# Patient Record
Sex: MALE | Race: WHITE | HISPANIC OR LATINO | Employment: UNEMPLOYED | URBAN - METROPOLITAN AREA
[De-identification: names, ages, dates, MRNs, and addresses within clinical notes are randomized per-mention and may not be internally consistent; named-entity substitution may affect disease eponyms.]

---

## 2022-03-01 ENCOUNTER — APPOINTMENT (OUTPATIENT)
Dept: CT IMAGING | Facility: HOSPITAL | Age: 22
End: 2022-03-01

## 2022-03-01 ENCOUNTER — HOSPITAL ENCOUNTER (INPATIENT)
Facility: HOSPITAL | Age: 22
LOS: 4 days | Discharge: HOME | End: 2022-03-05
Attending: EMERGENCY MEDICINE | Admitting: SURGERY

## 2022-03-01 ENCOUNTER — ANESTHESIA (OUTPATIENT)
Dept: PERIOP | Facility: HOSPITAL | Age: 22
End: 2022-03-01

## 2022-03-01 ENCOUNTER — ANESTHESIA EVENT (OUTPATIENT)
Dept: PERIOP | Facility: HOSPITAL | Age: 22
End: 2022-03-01

## 2022-03-01 DIAGNOSIS — K35.30 ACUTE APPENDICITIS WITH LOCALIZED PERITONITIS, WITHOUT PERFORATION, ABSCESS, OR GANGRENE: Primary | ICD-10-CM

## 2022-03-01 PROBLEM — K35.32 ACUTE APPENDICITIS WITH PERFORATION AND LOCALIZED PERITONITIS: Status: ACTIVE | Noted: 2022-03-01

## 2022-03-01 LAB
ALBUMIN SERPL-MCNC: 4.9 G/DL (ref 3.5–5.2)
ALBUMIN/GLOB SERPL: 1.8 G/DL
ALP SERPL-CCNC: 71 U/L (ref 39–117)
ALT SERPL W P-5'-P-CCNC: 14 U/L (ref 1–41)
ANION GAP SERPL CALCULATED.3IONS-SCNC: 9.1 MMOL/L (ref 5–15)
AST SERPL-CCNC: 15 U/L (ref 1–40)
BASOPHILS # BLD AUTO: 0.03 10*3/MM3 (ref 0–0.2)
BASOPHILS NFR BLD AUTO: 0.2 % (ref 0–1.5)
BILIRUB SERPL-MCNC: 1 MG/DL (ref 0–1.2)
BILIRUB UR QL STRIP: NEGATIVE
BUN SERPL-MCNC: 6 MG/DL (ref 6–20)
BUN/CREAT SERPL: 7.6 (ref 7–25)
CALCIUM SPEC-SCNC: 9.7 MG/DL (ref 8.6–10.5)
CHLORIDE SERPL-SCNC: 100 MMOL/L (ref 98–107)
CLARITY UR: CLEAR
CO2 SERPL-SCNC: 25.9 MMOL/L (ref 22–29)
COLOR UR: YELLOW
CREAT SERPL-MCNC: 0.79 MG/DL (ref 0.76–1.27)
DEPRECATED RDW RBC AUTO: 42.1 FL (ref 37–54)
EGFRCR SERPLBLD CKD-EPI 2021: 131.2 ML/MIN/1.73
EOSINOPHIL # BLD AUTO: 0.02 10*3/MM3 (ref 0–0.4)
EOSINOPHIL NFR BLD AUTO: 0.2 % (ref 0.3–6.2)
ERYTHROCYTE [DISTWIDTH] IN BLOOD BY AUTOMATED COUNT: 12.1 % (ref 12.3–15.4)
GLOBULIN UR ELPH-MCNC: 2.8 GM/DL
GLUCOSE SERPL-MCNC: 111 MG/DL (ref 65–99)
GLUCOSE UR STRIP-MCNC: NEGATIVE MG/DL
HCT VFR BLD AUTO: 43.3 % (ref 37.5–51)
HGB BLD-MCNC: 14.7 G/DL (ref 13–17.7)
HGB UR QL STRIP.AUTO: NEGATIVE
IMM GRANULOCYTES # BLD AUTO: 0.03 10*3/MM3 (ref 0–0.05)
IMM GRANULOCYTES NFR BLD AUTO: 0.2 % (ref 0–0.5)
KETONES UR QL STRIP: NEGATIVE
LEUKOCYTE ESTERASE UR QL STRIP.AUTO: NEGATIVE
LIPASE SERPL-CCNC: 17 U/L (ref 13–60)
LYMPHOCYTES # BLD AUTO: 1.29 10*3/MM3 (ref 0.7–3.1)
LYMPHOCYTES NFR BLD AUTO: 9.9 % (ref 19.6–45.3)
MCH RBC QN AUTO: 31.6 PG (ref 26.6–33)
MCHC RBC AUTO-ENTMCNC: 33.9 G/DL (ref 31.5–35.7)
MCV RBC AUTO: 93.1 FL (ref 79–97)
MONOCYTES # BLD AUTO: 1.12 10*3/MM3 (ref 0.1–0.9)
MONOCYTES NFR BLD AUTO: 8.6 % (ref 5–12)
NEUTROPHILS NFR BLD AUTO: 10.6 10*3/MM3 (ref 1.7–7)
NEUTROPHILS NFR BLD AUTO: 80.9 % (ref 42.7–76)
NITRITE UR QL STRIP: NEGATIVE
PH UR STRIP.AUTO: 7.5 [PH] (ref 4.5–8)
PLATELET # BLD AUTO: 209 10*3/MM3 (ref 140–450)
PMV BLD AUTO: 9.2 FL (ref 6–12)
POTASSIUM SERPL-SCNC: 4.3 MMOL/L (ref 3.5–5.2)
PROT SERPL-MCNC: 7.7 G/DL (ref 6–8.5)
PROT UR QL STRIP: NEGATIVE
RBC # BLD AUTO: 4.65 10*6/MM3 (ref 4.14–5.8)
SARS-COV-2 RNA PNL SPEC NAA+PROBE: NOT DETECTED
SODIUM SERPL-SCNC: 135 MMOL/L (ref 136–145)
SP GR UR STRIP: 1.02 (ref 1–1.03)
UROBILINOGEN UR QL STRIP: NORMAL
WBC NRBC COR # BLD: 13.09 10*3/MM3 (ref 3.4–10.8)

## 2022-03-01 PROCEDURE — 94799 UNLISTED PULMONARY SVC/PX: CPT

## 2022-03-01 PROCEDURE — 81003 URINALYSIS AUTO W/O SCOPE: CPT | Performed by: EMERGENCY MEDICINE

## 2022-03-01 PROCEDURE — 25010000002 ONDANSETRON PER 1 MG: Performed by: SURGERY

## 2022-03-01 PROCEDURE — 87077 CULTURE AEROBIC IDENTIFY: CPT | Performed by: SURGERY

## 2022-03-01 PROCEDURE — 25010000002 MORPHINE PER 10 MG: Performed by: SURGERY

## 2022-03-01 PROCEDURE — 99223 1ST HOSP IP/OBS HIGH 75: CPT | Performed by: SURGERY

## 2022-03-01 PROCEDURE — 87205 SMEAR GRAM STAIN: CPT | Performed by: SURGERY

## 2022-03-01 PROCEDURE — C1889 IMPLANT/INSERT DEVICE, NOC: HCPCS | Performed by: SURGERY

## 2022-03-01 PROCEDURE — 85025 COMPLETE CBC W/AUTO DIFF WBC: CPT | Performed by: EMERGENCY MEDICINE

## 2022-03-01 PROCEDURE — 25010000002 PROPOFOL 10 MG/ML EMULSION: Performed by: REGISTERED NURSE

## 2022-03-01 PROCEDURE — 25010000002 ONDANSETRON PER 1 MG: Performed by: NURSE ANESTHETIST, CERTIFIED REGISTERED

## 2022-03-01 PROCEDURE — 44960 APPENDECTOMY: CPT | Performed by: SURGERY

## 2022-03-01 PROCEDURE — 25010000002 SUCCINYLCHOLINE PER 20 MG: Performed by: REGISTERED NURSE

## 2022-03-01 PROCEDURE — 0WJG4ZZ INSPECTION OF PERITONEAL CAVITY, PERCUTANEOUS ENDOSCOPIC APPROACH: ICD-10-PCS | Performed by: SURGERY

## 2022-03-01 PROCEDURE — 87076 CULTURE ANAEROBE IDENT EACH: CPT | Performed by: SURGERY

## 2022-03-01 PROCEDURE — 25010000002 ONDANSETRON PER 1 MG: Performed by: EMERGENCY MEDICINE

## 2022-03-01 PROCEDURE — 87075 CULTR BACTERIA EXCEPT BLOOD: CPT | Performed by: SURGERY

## 2022-03-01 PROCEDURE — 0 IOPAMIDOL PER 1 ML: Performed by: EMERGENCY MEDICINE

## 2022-03-01 PROCEDURE — 25010000002 PIPERACILLIN SOD-TAZOBACTAM PER 1 G: Performed by: SURGERY

## 2022-03-01 PROCEDURE — 83690 ASSAY OF LIPASE: CPT | Performed by: EMERGENCY MEDICINE

## 2022-03-01 PROCEDURE — 94761 N-INVAS EAR/PLS OXIMETRY MLT: CPT

## 2022-03-01 PROCEDURE — 0DTJ0ZZ RESECTION OF APPENDIX, OPEN APPROACH: ICD-10-PCS | Performed by: SURGERY

## 2022-03-01 PROCEDURE — 74177 CT ABD & PELVIS W/CONTRAST: CPT

## 2022-03-01 PROCEDURE — 25010000002 PIPERACILLIN SOD-TAZOBACTAM PER 1 G

## 2022-03-01 PROCEDURE — 87635 SARS-COV-2 COVID-19 AMP PRB: CPT | Performed by: EMERGENCY MEDICINE

## 2022-03-01 PROCEDURE — 25010000002 FENTANYL CITRATE (PF) 50 MCG/ML SOLUTION: Performed by: REGISTERED NURSE

## 2022-03-01 PROCEDURE — 88304 TISSUE EXAM BY PATHOLOGIST: CPT | Performed by: SURGERY

## 2022-03-01 PROCEDURE — 80053 COMPREHEN METABOLIC PANEL: CPT | Performed by: EMERGENCY MEDICINE

## 2022-03-01 PROCEDURE — 87070 CULTURE OTHR SPECIMN AEROBIC: CPT | Performed by: SURGERY

## 2022-03-01 PROCEDURE — 25010000002 HYDROMORPHONE 1 MG/ML SOLUTION: Performed by: REGISTERED NURSE

## 2022-03-01 PROCEDURE — 99284 EMERGENCY DEPT VISIT MOD MDM: CPT

## 2022-03-01 PROCEDURE — 25010000002 MORPHINE PER 10 MG: Performed by: EMERGENCY MEDICINE

## 2022-03-01 PROCEDURE — 25010000002 NEOSTIGMINE 10 MG/10ML SOLUTION: Performed by: REGISTERED NURSE

## 2022-03-01 PROCEDURE — 25010000002 DEXAMETHASONE PER 1 MG: Performed by: NURSE ANESTHETIST, CERTIFIED REGISTERED

## 2022-03-01 PROCEDURE — 99284 EMERGENCY DEPT VISIT MOD MDM: CPT | Performed by: EMERGENCY MEDICINE

## 2022-03-01 DEVICE — ENDOPATH ECHELON VASCULAR  RELOADS, WHITE, 35MM
Type: IMPLANTABLE DEVICE | Site: ABDOMEN | Status: FUNCTIONAL
Brand: ECHELON ENDOPATH

## 2022-03-01 DEVICE — LIGAMAX 5 MM ENDOSCOPIC MULTIPLE CLIP APPLIER
Type: IMPLANTABLE DEVICE | Site: ABDOMEN | Status: FUNCTIONAL
Brand: LIGAMAX

## 2022-03-01 DEVICE — LIGACLIP MCA MULTIPLE CLIP APPLIERS, 20 MEDIUM CLIPS
Type: IMPLANTABLE DEVICE | Site: ABDOMEN | Status: FUNCTIONAL
Brand: LIGACLIP

## 2022-03-01 RX ORDER — LIDOCAINE HYDROCHLORIDE 10 MG/ML
0.5 INJECTION, SOLUTION EPIDURAL; INFILTRATION; INTRACAUDAL; PERINEURAL ONCE AS NEEDED
Status: DISCONTINUED | OUTPATIENT
Start: 2022-03-01 | End: 2022-03-01 | Stop reason: HOSPADM

## 2022-03-01 RX ORDER — OXYCODONE AND ACETAMINOPHEN 7.5; 325 MG/1; MG/1
1 TABLET ORAL ONCE AS NEEDED
Status: DISCONTINUED | OUTPATIENT
Start: 2022-03-01 | End: 2022-03-01 | Stop reason: HOSPADM

## 2022-03-01 RX ORDER — SODIUM CHLORIDE 0.9 % (FLUSH) 0.9 %
10 SYRINGE (ML) INJECTION AS NEEDED
Status: DISCONTINUED | OUTPATIENT
Start: 2022-03-01 | End: 2022-03-01 | Stop reason: HOSPADM

## 2022-03-01 RX ORDER — ONDANSETRON 2 MG/ML
4 INJECTION INTRAMUSCULAR; INTRAVENOUS ONCE AS NEEDED
Status: DISCONTINUED | OUTPATIENT
Start: 2022-03-01 | End: 2022-03-01 | Stop reason: HOSPADM

## 2022-03-01 RX ORDER — MIDAZOLAM HYDROCHLORIDE 2 MG/2ML
1 INJECTION, SOLUTION INTRAMUSCULAR; INTRAVENOUS
Status: DISCONTINUED | OUTPATIENT
Start: 2022-03-01 | End: 2022-03-01 | Stop reason: HOSPADM

## 2022-03-01 RX ORDER — SODIUM CHLORIDE 0.9 % (FLUSH) 0.9 %
10 SYRINGE (ML) INJECTION AS NEEDED
Status: DISCONTINUED | OUTPATIENT
Start: 2022-03-01 | End: 2022-03-05 | Stop reason: HOSPADM

## 2022-03-01 RX ORDER — SODIUM CHLORIDE, SODIUM LACTATE, POTASSIUM CHLORIDE, CALCIUM CHLORIDE 600; 310; 30; 20 MG/100ML; MG/100ML; MG/100ML; MG/100ML
100 INJECTION, SOLUTION INTRAVENOUS CONTINUOUS
Status: DISCONTINUED | OUTPATIENT
Start: 2022-03-01 | End: 2022-03-04

## 2022-03-01 RX ORDER — GLYCOPYRROLATE 0.2 MG/ML
INJECTION INTRAMUSCULAR; INTRAVENOUS AS NEEDED
Status: DISCONTINUED | OUTPATIENT
Start: 2022-03-01 | End: 2022-03-01 | Stop reason: SURG

## 2022-03-01 RX ORDER — MAGNESIUM HYDROXIDE 1200 MG/15ML
LIQUID ORAL AS NEEDED
Status: DISCONTINUED | OUTPATIENT
Start: 2022-03-01 | End: 2022-03-01 | Stop reason: HOSPADM

## 2022-03-01 RX ORDER — PIPERACILLIN SODIUM, TAZOBACTAM SODIUM 4; .5 G/20ML; G/20ML
INJECTION, POWDER, LYOPHILIZED, FOR SOLUTION INTRAVENOUS
Status: COMPLETED
Start: 2022-03-01 | End: 2022-03-01

## 2022-03-01 RX ORDER — NEOSTIGMINE METHYLSULFATE 1 MG/ML
INJECTION, SOLUTION INTRAVENOUS AS NEEDED
Status: DISCONTINUED | OUTPATIENT
Start: 2022-03-01 | End: 2022-03-01 | Stop reason: SURG

## 2022-03-01 RX ORDER — SODIUM CHLORIDE 9 MG/ML
40 INJECTION, SOLUTION INTRAVENOUS AS NEEDED
Status: DISCONTINUED | OUTPATIENT
Start: 2022-03-01 | End: 2022-03-01 | Stop reason: HOSPADM

## 2022-03-01 RX ORDER — PROPOFOL 10 MG/ML
VIAL (ML) INTRAVENOUS AS NEEDED
Status: DISCONTINUED | OUTPATIENT
Start: 2022-03-01 | End: 2022-03-01 | Stop reason: SURG

## 2022-03-01 RX ORDER — ACETAMINOPHEN 325 MG/1
650 TABLET ORAL EVERY 6 HOURS PRN
Status: DISCONTINUED | OUTPATIENT
Start: 2022-03-01 | End: 2022-03-05 | Stop reason: HOSPADM

## 2022-03-01 RX ORDER — MORPHINE SULFATE 10 MG/ML
4 INJECTION INTRAMUSCULAR; INTRAVENOUS; SUBCUTANEOUS
Status: DISCONTINUED | OUTPATIENT
Start: 2022-03-01 | End: 2022-03-05 | Stop reason: HOSPADM

## 2022-03-01 RX ORDER — MORPHINE SULFATE 2 MG/ML
2 INJECTION, SOLUTION INTRAMUSCULAR; INTRAVENOUS
Status: DISCONTINUED | OUTPATIENT
Start: 2022-03-01 | End: 2022-03-05 | Stop reason: HOSPADM

## 2022-03-01 RX ORDER — DEXMEDETOMIDINE HYDROCHLORIDE 100 UG/ML
INJECTION, SOLUTION INTRAVENOUS AS NEEDED
Status: DISCONTINUED | OUTPATIENT
Start: 2022-03-01 | End: 2022-03-01 | Stop reason: SURG

## 2022-03-01 RX ORDER — OXYCODONE HYDROCHLORIDE AND ACETAMINOPHEN 5; 325 MG/1; MG/1
1 TABLET ORAL EVERY 4 HOURS PRN
Status: DISCONTINUED | OUTPATIENT
Start: 2022-03-01 | End: 2022-03-05 | Stop reason: HOSPADM

## 2022-03-01 RX ORDER — SODIUM CHLORIDE 9 MG/ML
INJECTION, SOLUTION INTRAVENOUS
Status: DISPENSED
Start: 2022-03-01 | End: 2022-03-02

## 2022-03-01 RX ORDER — BUPIVACAINE HYDROCHLORIDE AND EPINEPHRINE 2.5; 5 MG/ML; UG/ML
INJECTION, SOLUTION EPIDURAL; INFILTRATION; INTRACAUDAL; PERINEURAL AS NEEDED
Status: DISCONTINUED | OUTPATIENT
Start: 2022-03-01 | End: 2022-03-01 | Stop reason: HOSPADM

## 2022-03-01 RX ORDER — ROCURONIUM BROMIDE 10 MG/ML
INJECTION, SOLUTION INTRAVENOUS AS NEEDED
Status: DISCONTINUED | OUTPATIENT
Start: 2022-03-01 | End: 2022-03-01 | Stop reason: SURG

## 2022-03-01 RX ORDER — SODIUM CHLORIDE 0.9 % (FLUSH) 0.9 %
10 SYRINGE (ML) INJECTION EVERY 12 HOURS SCHEDULED
Status: DISCONTINUED | OUTPATIENT
Start: 2022-03-01 | End: 2022-03-01 | Stop reason: HOSPADM

## 2022-03-01 RX ORDER — FENTANYL CITRATE 50 UG/ML
25 INJECTION, SOLUTION INTRAMUSCULAR; INTRAVENOUS
Status: DISCONTINUED | OUTPATIENT
Start: 2022-03-01 | End: 2022-03-01 | Stop reason: HOSPADM

## 2022-03-01 RX ORDER — SODIUM CHLORIDE, SODIUM LACTATE, POTASSIUM CHLORIDE, CALCIUM CHLORIDE 600; 310; 30; 20 MG/100ML; MG/100ML; MG/100ML; MG/100ML
100 INJECTION, SOLUTION INTRAVENOUS CONTINUOUS
Status: DISCONTINUED | OUTPATIENT
Start: 2022-03-01 | End: 2022-03-01

## 2022-03-01 RX ORDER — SUCCINYLCHOLINE CHLORIDE 20 MG/ML
INJECTION INTRAMUSCULAR; INTRAVENOUS AS NEEDED
Status: DISCONTINUED | OUTPATIENT
Start: 2022-03-01 | End: 2022-03-01 | Stop reason: SURG

## 2022-03-01 RX ORDER — DEXAMETHASONE SODIUM PHOSPHATE 4 MG/ML
8 INJECTION, SOLUTION INTRA-ARTICULAR; INTRALESIONAL; INTRAMUSCULAR; INTRAVENOUS; SOFT TISSUE ONCE AS NEEDED
Status: COMPLETED | OUTPATIENT
Start: 2022-03-01 | End: 2022-03-01

## 2022-03-01 RX ORDER — ONDANSETRON 2 MG/ML
4 INJECTION INTRAMUSCULAR; INTRAVENOUS ONCE AS NEEDED
Status: COMPLETED | OUTPATIENT
Start: 2022-03-01 | End: 2022-03-01

## 2022-03-01 RX ORDER — ONDANSETRON 2 MG/ML
4 INJECTION INTRAMUSCULAR; INTRAVENOUS EVERY 4 HOURS PRN
Status: DISCONTINUED | OUTPATIENT
Start: 2022-03-01 | End: 2022-03-05 | Stop reason: HOSPADM

## 2022-03-01 RX ORDER — FAMOTIDINE 10 MG/ML
20 INJECTION, SOLUTION INTRAVENOUS
Status: COMPLETED | OUTPATIENT
Start: 2022-03-01 | End: 2022-03-01

## 2022-03-01 RX ORDER — FENTANYL CITRATE 50 UG/ML
INJECTION, SOLUTION INTRAMUSCULAR; INTRAVENOUS AS NEEDED
Status: DISCONTINUED | OUTPATIENT
Start: 2022-03-01 | End: 2022-03-01 | Stop reason: SURG

## 2022-03-01 RX ORDER — ACETAMINOPHEN 500 MG
500 TABLET ORAL EVERY 6 HOURS PRN
COMMUNITY
End: 2022-03-05 | Stop reason: HOSPADM

## 2022-03-01 RX ORDER — KETAMINE HYDROCHLORIDE 100 MG/ML
INJECTION INTRAMUSCULAR; INTRAVENOUS AS NEEDED
Status: DISCONTINUED | OUTPATIENT
Start: 2022-03-01 | End: 2022-03-01 | Stop reason: SURG

## 2022-03-01 RX ORDER — MEPERIDINE HYDROCHLORIDE 25 MG/ML
12.5 INJECTION INTRAMUSCULAR; INTRAVENOUS; SUBCUTANEOUS
Status: DISCONTINUED | OUTPATIENT
Start: 2022-03-01 | End: 2022-03-01 | Stop reason: HOSPADM

## 2022-03-01 RX ORDER — SODIUM CHLORIDE, SODIUM LACTATE, POTASSIUM CHLORIDE, CALCIUM CHLORIDE 600; 310; 30; 20 MG/100ML; MG/100ML; MG/100ML; MG/100ML
9 INJECTION, SOLUTION INTRAVENOUS CONTINUOUS
Status: DISCONTINUED | OUTPATIENT
Start: 2022-03-01 | End: 2022-03-01

## 2022-03-01 RX ORDER — ONDANSETRON 2 MG/ML
4 INJECTION INTRAMUSCULAR; INTRAVENOUS ONCE
Status: COMPLETED | OUTPATIENT
Start: 2022-03-01 | End: 2022-03-01

## 2022-03-01 RX ADMIN — PIPERACILLIN AND TAZOBACTAM 4.5 G: 4; .5 INJECTION, POWDER, LYOPHILIZED, FOR SOLUTION INTRAVENOUS; PARENTERAL at 12:44

## 2022-03-01 RX ADMIN — ONDANSETRON 4 MG: 2 INJECTION INTRAMUSCULAR; INTRAVENOUS at 21:14

## 2022-03-01 RX ADMIN — ROCURONIUM BROMIDE 5 MG: 10 INJECTION INTRAVENOUS at 13:39

## 2022-03-01 RX ADMIN — HYDROMORPHONE HYDROCHLORIDE 0.25 MG: 1 INJECTION, SOLUTION INTRAMUSCULAR; INTRAVENOUS; SUBCUTANEOUS at 17:13

## 2022-03-01 RX ADMIN — DEXMEDETOMIDINE 4 MCG: 100 INJECTION, SOLUTION, CONCENTRATE INTRAVENOUS at 15:04

## 2022-03-01 RX ADMIN — ONDANSETRON 4 MG: 2 INJECTION INTRAMUSCULAR; INTRAVENOUS at 13:25

## 2022-03-01 RX ADMIN — DEXMEDETOMIDINE 4 MCG: 100 INJECTION, SOLUTION, CONCENTRATE INTRAVENOUS at 13:39

## 2022-03-01 RX ADMIN — FENTANYL CITRATE 100 MCG: 50 INJECTION INTRAMUSCULAR; INTRAVENOUS at 14:40

## 2022-03-01 RX ADMIN — KETAMINE HYDROCHLORIDE 10 MG: 100 INJECTION INTRAMUSCULAR; INTRAVENOUS at 14:13

## 2022-03-01 RX ADMIN — HYDROMORPHONE HYDROCHLORIDE 0.25 MG: 1 INJECTION, SOLUTION INTRAMUSCULAR; INTRAVENOUS; SUBCUTANEOUS at 17:00

## 2022-03-01 RX ADMIN — DEXMEDETOMIDINE 4 MCG: 100 INJECTION, SOLUTION, CONCENTRATE INTRAVENOUS at 14:34

## 2022-03-01 RX ADMIN — MORPHINE SULFATE 4 MG: 4 INJECTION INTRAVENOUS at 11:43

## 2022-03-01 RX ADMIN — FENTANYL CITRATE 50 MCG: 50 INJECTION INTRAMUSCULAR; INTRAVENOUS at 13:39

## 2022-03-01 RX ADMIN — HYDROMORPHONE HYDROCHLORIDE 0.25 MG: 1 INJECTION, SOLUTION INTRAMUSCULAR; INTRAVENOUS; SUBCUTANEOUS at 17:23

## 2022-03-01 RX ADMIN — KETAMINE HYDROCHLORIDE 10 MG: 100 INJECTION INTRAMUSCULAR; INTRAVENOUS at 14:34

## 2022-03-01 RX ADMIN — IOPAMIDOL 100 ML: 755 INJECTION, SOLUTION INTRAVENOUS at 12:04

## 2022-03-01 RX ADMIN — DEXMEDETOMIDINE 4 MCG: 100 INJECTION, SOLUTION, CONCENTRATE INTRAVENOUS at 14:13

## 2022-03-01 RX ADMIN — KETAMINE HYDROCHLORIDE 10 MG: 100 INJECTION INTRAMUSCULAR; INTRAVENOUS at 13:39

## 2022-03-01 RX ADMIN — HYDROMORPHONE HYDROCHLORIDE 0.25 MG: 1 INJECTION, SOLUTION INTRAMUSCULAR; INTRAVENOUS; SUBCUTANEOUS at 16:35

## 2022-03-01 RX ADMIN — MORPHINE SULFATE 2 MG: 2 INJECTION, SOLUTION INTRAMUSCULAR; INTRAVENOUS at 20:41

## 2022-03-01 RX ADMIN — SUCCINYLCHOLINE CHLORIDE 100 MG: 20 INJECTION, SOLUTION INTRAMUSCULAR; INTRAVENOUS at 13:39

## 2022-03-01 RX ADMIN — HYDROMORPHONE HYDROCHLORIDE 0.25 MG: 1 INJECTION, SOLUTION INTRAMUSCULAR; INTRAVENOUS; SUBCUTANEOUS at 16:25

## 2022-03-01 RX ADMIN — DEXAMETHASONE SODIUM PHOSPHATE 8 MG: 4 INJECTION, SOLUTION INTRAMUSCULAR; INTRAVENOUS at 13:24

## 2022-03-01 RX ADMIN — ROCURONIUM BROMIDE 10 MG: 10 INJECTION INTRAVENOUS at 14:34

## 2022-03-01 RX ADMIN — ONDANSETRON 4 MG: 2 INJECTION INTRAMUSCULAR; INTRAVENOUS at 11:42

## 2022-03-01 RX ADMIN — SODIUM CHLORIDE, POTASSIUM CHLORIDE, SODIUM LACTATE AND CALCIUM CHLORIDE 9 ML/HR: 600; 310; 30; 20 INJECTION, SOLUTION INTRAVENOUS at 13:25

## 2022-03-01 RX ADMIN — FAMOTIDINE 20 MG: 10 INJECTION, SOLUTION INTRAVENOUS at 13:25

## 2022-03-01 RX ADMIN — SODIUM CHLORIDE, POTASSIUM CHLORIDE, SODIUM LACTATE AND CALCIUM CHLORIDE 100 ML/HR: 600; 310; 30; 20 INJECTION, SOLUTION INTRAVENOUS at 20:26

## 2022-03-01 RX ADMIN — FENTANYL CITRATE 50 MCG: 50 INJECTION INTRAMUSCULAR; INTRAVENOUS at 13:45

## 2022-03-01 RX ADMIN — GLYCOPYRROLATE 0.4 MG: 0.2 INJECTION INTRAMUSCULAR; INTRAVENOUS at 15:31

## 2022-03-01 RX ADMIN — PIPERACILLIN AND TAZOBACTAM 4.5 G: 4; .5 INJECTION, POWDER, LYOPHILIZED, FOR SOLUTION INTRAVENOUS; PARENTERAL at 20:26

## 2022-03-01 RX ADMIN — NEOSTIGMINE METHYLSULFATE 3 MG: 1 INJECTION INTRAVENOUS at 15:31

## 2022-03-01 RX ADMIN — ROCURONIUM BROMIDE 30 MG: 10 INJECTION INTRAVENOUS at 13:47

## 2022-03-01 RX ADMIN — PROPOFOL 200 MG: 10 INJECTION, EMULSION INTRAVENOUS at 13:39

## 2022-03-01 RX ADMIN — HYDROMORPHONE HYDROCHLORIDE 0.25 MG: 1 INJECTION, SOLUTION INTRAMUSCULAR; INTRAVENOUS; SUBCUTANEOUS at 16:15

## 2022-03-01 NOTE — PLAN OF CARE
Goal Outcome Evaluation:  Plan of Care Reviewed With: patient        Progress: improving  Outcome Summary: vss. pt arrived to floor s/p lap appy, dressing in place c,d,i. RAVEN drain with serrosangeous output noted. pt a&o x4 with significant other at bedside. sepsis screened positive- md aware. iv antibiotics in place. iv fluids in place. no other complaints at this time.

## 2022-03-01 NOTE — ANESTHESIA PREPROCEDURE EVALUATION
Anesthesia Evaluation     Patient summary reviewed and Nursing notes reviewed   NPO Solid Status: > 8 hours  NPO Liquid Status: > 2 hours           Airway   Mallampati: II  TM distance: >3 FB  Neck ROM: full  No difficulty expected  Dental - normal exam     Pulmonary - negative pulmonary ROS    breath sounds clear to auscultation  Cardiovascular   Exercise tolerance: good (4-7 METS)    Rhythm: regular  Rate: abnormal        Neuro/Psych- negative ROS  GI/Hepatic/Renal/Endo - negative ROS     Musculoskeletal     Abdominal    Substance History - negative use     OB/GYN negative ob/gyn ROS         Other        ROS/Med Hx Other: Tylenol at 1200.    Banana milkshake at 0900                  Anesthesia Plan    ASA 1 - emergent     general     intravenous induction     Anesthetic plan, all risks, benefits, and alternatives have been provided, discussed and informed consent has been obtained with: patient.  Use of blood products discussed with patient  Consented to blood products.   Plan discussed with CRNA.        CODE STATUS:

## 2022-03-01 NOTE — ED PROVIDER NOTES
EMERGENCY DEPARTMENT ENCOUNTER    Room Number:  [facility] OR/MAIN OR  Date seen:  3/1/2022  PCP: Provider, No Known  Historian: Patient      HPI:  Chief Complaint: Abdominal pain  A complete HPI/ROS/PMH/PSH/SH/FH are unobtainable due to: Nothing  Context: Juan Michaels is a 21 y.o. male who presents to the ED c/o right lower quadrant abdominal pain that has been ongoing for the last 2 or 3 days. He has had associated nausea and vomiting. He denies diarrhea. He thinks he may have had fever 1 day. He denies any previous abdominal surgeries. He does not smoke. Pain is currently moderate severity, localized to the right lower quadrant, nonradiating. He denies dysuria.            PAST MEDICAL HISTORY  Active Ambulatory Problems     Diagnosis Date Noted   • No Active Ambulatory Problems     Resolved Ambulatory Problems     Diagnosis Date Noted   • No Resolved Ambulatory Problems     No Additional Past Medical History         PAST SURGICAL HISTORY  History reviewed. No pertinent surgical history.      FAMILY HISTORY  History reviewed. No pertinent family history.      SOCIAL HISTORY  Social History     Socioeconomic History   • Marital status: Single   Tobacco Use   • Smoking status: Never Smoker   Substance and Sexual Activity   • Alcohol use: Yes     Comment: a little   • Drug use: Never   Patient does not smoke    ALLERGIES  Patient has no known allergies.        REVIEW OF SYSTEMS  Review of Systems   Review of all 14 systems is negative other than stated in the HPI above.      PHYSICAL EXAM  ED Triage Vitals [03/01/22 1049]   Temp Heart Rate Resp BP SpO2   98.8 °F (37.1 °C) (!) 134 16 141/81 97 %      Temp src Heart Rate Source Patient Position BP Location FiO2 (%)   Oral Monitor -- -- --         GENERAL: Awake and alert, no acute distress  HENT: nares patent  EYES: no scleral icterus, EOMI, pupils 3 mm reactive bilaterally  CV: regular rhythm, mildly tachycardic  RESPIRATORY: normal effort, lungs clear to auscultation  bilaterally  ABDOMEN: soft, focal tenderness right lower quadrant with rebound tenderness present, abdomen nondistended, negative Rovsing's  MUSCULOSKELETAL: no deformity  NEURO: alert, moves all extremities, follows commands  PSYCH:  calm, cooperative  SKIN: warm, dry, normal to inspection, no rash    Vital signs and nursing notes reviewed.          LAB RESULTS  Recent Results (from the past 24 hour(s))   Comprehensive Metabolic Panel    Collection Time: 03/01/22 11:05 AM    Specimen: Blood   Result Value Ref Range    Glucose 111 (H) 65 - 99 mg/dL    BUN 6 6 - 20 mg/dL    Creatinine 0.79 0.76 - 1.27 mg/dL    Sodium 135 (L) 136 - 145 mmol/L    Potassium 4.3 3.5 - 5.2 mmol/L    Chloride 100 98 - 107 mmol/L    CO2 25.9 22.0 - 29.0 mmol/L    Calcium 9.7 8.6 - 10.5 mg/dL    Total Protein 7.7 6.0 - 8.5 g/dL    Albumin 4.90 3.50 - 5.20 g/dL    ALT (SGPT) 14 1 - 41 U/L    AST (SGOT) 15 1 - 40 U/L    Alkaline Phosphatase 71 39 - 117 U/L    Total Bilirubin 1.0 0.0 - 1.2 mg/dL    Globulin 2.8 gm/dL    A/G Ratio 1.8 g/dL    BUN/Creatinine Ratio 7.6 7.0 - 25.0    Anion Gap 9.1 5.0 - 15.0 mmol/L    eGFR 131.2 >60.0 mL/min/1.73   Lipase    Collection Time: 03/01/22 11:05 AM    Specimen: Blood   Result Value Ref Range    Lipase 17 13 - 60 U/L   CBC Auto Differential    Collection Time: 03/01/22 11:05 AM    Specimen: Blood   Result Value Ref Range    WBC 13.09 (H) 3.40 - 10.80 10*3/mm3    RBC 4.65 4.14 - 5.80 10*6/mm3    Hemoglobin 14.7 13.0 - 17.7 g/dL    Hematocrit 43.3 37.5 - 51.0 %    MCV 93.1 79.0 - 97.0 fL    MCH 31.6 26.6 - 33.0 pg    MCHC 33.9 31.5 - 35.7 g/dL    RDW 12.1 (L) 12.3 - 15.4 %    RDW-SD 42.1 37.0 - 54.0 fl    MPV 9.2 6.0 - 12.0 fL    Platelets 209 140 - 450 10*3/mm3    Neutrophil % 80.9 (H) 42.7 - 76.0 %    Lymphocyte % 9.9 (L) 19.6 - 45.3 %    Monocyte % 8.6 5.0 - 12.0 %    Eosinophil % 0.2 (L) 0.3 - 6.2 %    Basophil % 0.2 0.0 - 1.5 %    Immature Grans % 0.2 0.0 - 0.5 %    Neutrophils, Absolute 10.60 (H) 1.70  - 7.00 10*3/mm3    Lymphocytes, Absolute 1.29 0.70 - 3.10 10*3/mm3    Monocytes, Absolute 1.12 (H) 0.10 - 0.90 10*3/mm3    Eosinophils, Absolute 0.02 0.00 - 0.40 10*3/mm3    Basophils, Absolute 0.03 0.00 - 0.20 10*3/mm3    Immature Grans, Absolute 0.03 0.00 - 0.05 10*3/mm3   Urinalysis With Microscopic If Indicated (No Culture) - Urine, Clean Catch    Collection Time: 03/01/22 11:25 AM    Specimen: Urine, Clean Catch   Result Value Ref Range    Color, UA Yellow Yellow, Straw    Appearance, UA Clear Clear    pH, UA 7.5 4.5 - 8.0    Specific Gravity, UA 1.025 1.003 - 1.030    Glucose, UA Negative Negative    Ketones, UA Negative Negative    Bilirubin, UA Negative Negative    Blood, UA Negative Negative    Protein, UA Negative Negative    Leuk Esterase, UA Negative Negative    Nitrite, UA Negative Negative    Urobilinogen, UA 0.2 E.U./dL 0.2 - 1.0 E.U./dL   COVID-19,[employer] IN-HOUSE,Nasal Swab No Transport Media 3-4 HR TAT - Swab, Nasal Cavity    Collection Time: 03/01/22 11:43 AM    Specimen: Nasal Cavity; Swab   Result Value Ref Range    COVID19 Not Detected Not Detected - Ref. Range       Ordered the above labs and reviewed the results.        RADIOLOGY  CT Abdomen Pelvis With Contrast    Result Date: 3/1/2022  CT Abdomen Pelvis W INDICATION: 21-year-old emergency department patient complaining of 2 day history of right-sided abdominal pain. Vomiting. TECHNIQUE: CT of the abdomen and pelvis with Isovue-370-100 cc IV contrast. Coronal and sagittal reconstructions were obtained.  Radiation dose reduction techniques included automated exposure control or exposure modulation based on body size. Count of known CT and cardiac med studies performed in previous 12 months: 0. COMPARISON: None available. FINDINGS: Abdomen: Included images through the lung bases are clear. There are no effusions. The distal esophagus is normal The liver, spleen, pancreas, gallbladder, bile ducts, adrenal glands and kidneys appear normal. The  abdominal aorta is normal in caliber. The stomach and small bowel are normal. The right lower quadrant is abnormal. The appendix is distended with thickened wall and enhancement of the mucosa. Diameter up to 2.2 cm. There is minimal adjacent fluid and stranding. Findings are consistent with acute appendicitis. The fluid extends into the right paracolic gutter cephalad to the cecum. No fluid is seen elsewhere in the abdomen or pelvis. Colon is normal. Pelvis: The bladder, seminal vesicles and prostate are normal. No inguinal hernia. Bones are normal     The right lower quadrant is abnormal consistent with acute appendicitis. There is linear stranding and fluid around the appendix extending into the right colic gutter. There is no free air seen. Perforation perforation of the appendix is a concern given the amount of surrounding fluid. There is no free air. Surgical consultation recommended NOTIFICATION: Critical Value/emergent results were called by telephone at the time of interpretation on 3/1/2022 12:19 PM to DIANN Tamayo for Dr. Allen MD who verbally acknowledged these results. Dr. Villa was in the room with the patient and the  tech agreed to communicate the findings to Dr. Villa who was aware of the findings of acute appendicitis already. Signer Name: Darlene Manning MD  Signed: 3/1/2022 12:22 PM  Workstation Name: [id number]  [facility]      Ordered the above noted radiological studies. Reviewed by me in PACS.            PROCEDURES  Procedures              MEDICATIONS GIVEN IN ER  Medications   sodium chloride 0.9 % flush 10 mL ( Intravenous MAR Hold 3/1/22 1306)   sodium chloride 0.9 % infusion  - ADS Override Pull (has no administration in time range)   piperacillin-tazobactam (ZOSYN) 4.5 g/100 mL 0.9% NS IVPB (mbp) (has no administration in time range)   sodium chloride 0.9 % flush 10 mL (has no administration in time range)   sodium chloride 0.9 % flush 10 mL (has no  administration in time range)   sodium chloride 0.9 % infusion 40 mL (has no administration in time range)   lidocaine PF 1% (XYLOCAINE) injection 0.5 mL (has no administration in time range)   lactated ringers infusion ( Intravenous Rate/Dose Change 3/1/22 1338)   Midazolam HCl (PF) (VERSED) injection 1 mg (has no administration in time range)   sterile water irrigation solution (500 mL Irrigation Given 3/1/22 1402)   ondansetron (ZOFRAN) injection 4 mg (4 mg Intravenous Given 3/1/22 1142)   morphine injection 4 mg (4 mg Intravenous Given 3/1/22 1143)   iopamidol (ISOVUE-370) 76 % injection 100 mL (100 mL Intravenous Given 3/1/22 1204)   piperacillin-tazobactam (ZOSYN) 4.5 (4-0.5) g injection  - ADS Override Pull (4.5 g  New Bag 3/1/22 1244)   famotidine (PEPCID) injection 20 mg (20 mg Intravenous Given 3/1/22 1325)   ondansetron (ZOFRAN) injection 4 mg (4 mg Intravenous Given 3/1/22 1325)   dexamethasone (DECADRON) injection 8 mg (8 mg Intravenous Given 3/1/22 1324)                   MEDICAL DECISION MAKING, PROGRESS, and CONSULTS    All labs have been independently reviewed by me.  All radiology studies have been reviewed by me and discussed with radiologist dictating the report.   EKG's independently viewed and interpreted by me.  Discussion below represents my analysis of pertinent findings related to patient's condition, differential diagnosis, treatment plan and final disposition.      Differential diagnosis includes but is not limited to:  Acute appendicitis  Terminal ileitis  Cholecystitis  Colitis  Ureteral calculus      ED Course as of 03/01/22 1452   Tue Mar 01, 2022   1215 CT abdomen pelvis independently interpreted in PACS.  There appears to be evidence of acute appendicitis with periappendiceal fat stranding in the right lower abdomen.  I not see evidence of perforation or abscess.  I have ordered empiric Zosyn.  I have placed a consult to general surgery. [JR]   1217 Discussed with Dr. Franz, general  surgery, who will come and evaluate the patient shortly. [JR]   1223 Patient informed of CT findings of appendicitis and plan for surgical consultation with Dr. Franz today.  He reports he has had nothing to eat or drink today.  I used the  service and answered all questions. [JR]   1241 COVID19: Not Detected [JR]      ED Course User Index  [JR] Tip Villa MD              I wore an N95 mask, face shield, and gloves during this patient encounter.  Patient also wearing a surgical mask.  Hand hygeine performed before and after seeing the patient.    DIAGNOSIS  Final diagnoses:   Acute appendicitis with localized peritonitis, without perforation, abscess, or gangrene         DISPOSITION  ADMIT            Latest Documented Vital Signs:  As of 14:52 EST  BP- 134/81 HR- (!) 134 Temp- (!) 103.2 °F (39.6 °C) (Oral) O2 sat- 98%        --    Please note that portions of this were completed with a voice recognition program.          Tip Villa MD  03/01/22 6405

## 2022-03-01 NOTE — ANESTHESIA PROCEDURE NOTES
Airway  Urgency: elective    Date/Time: 3/1/2022 1:40 PM  Airway not difficult    General Information and Staff    Patient location during procedure: OR    Indications and Patient Condition  Indications for airway management: airway protection    Preoxygenated: yes  MILS maintained throughout  Mask difficulty assessment: 1 - vent by mask    Final Airway Details  Final airway type: endotracheal airway      Successful airway: ETT  Cuffed: yes   Successful intubation technique: direct laryngoscopy  Facilitating devices/methods: intubating stylet  Endotracheal tube insertion site: oral  Blade: Wells  Blade size: 3  ETT size (mm): 7.0  Cormack-Lehane Classification: grade IIa - partial view of glottis  Placement verified by: chest auscultation and capnometry   Cuff volume (mL): 5  Measured from: lips  ETT/EBT  to lips (cm): 22  Number of attempts at approach: 1  Assessment: lips, teeth, and gum same as pre-op and atraumatic intubation

## 2022-03-01 NOTE — ANESTHESIA POSTPROCEDURE EVALUATION
Patient: Juan Michaels    Procedure Summary     Date: 03/01/22 Room / Location:  [facility] OR 2 /  [facility] OR    Anesthesia Start: 1338 Anesthesia Stop: 1549    Procedures:       APPENDECTOMY LAPAROSCOPIC, attempted (N/A Abdomen)      APPENDECTOMY (N/A Abdomen) Diagnosis:       Acute appendicitis with localized peritonitis, without perforation, abscess, or gangrene      (Acute appendicitis with localized peritonitis, without perforation, abscess, or gangrene [K35.30])    Surgeons: Sherif Franz MD Provider: Grayson Mantilla CRNA    Anesthesia Type: general ASA Status: 1 - Emergent          Anesthesia Type: general    Vitals  Vitals Value Taken Time   /57 03/01/22 1725   Temp 98 °F (36.7 °C) 03/01/22 1544   Pulse 98 03/01/22 1731   Resp 17 03/01/22 1725   SpO2 96 % 03/01/22 1731   Vitals shown include unvalidated device data.        Post Anesthesia Care and Evaluation    Patient location during evaluation: PACU  Patient participation: complete - patient participated  Level of consciousness: awake  Pain score: 1  Pain management: adequate  Airway patency: patent  Anesthetic complications: No anesthetic complications  PONV Status: noneHydration status: acceptable

## 2022-03-01 NOTE — OP NOTE
Preoperative diagnosis acute appendicitis  Postoperative diagnosis acute perforated retrocecal appendicitis  Procedure laparoscopic converted to an open appendectomy  Complications none  Drains 10 [place] Roc drain  Estimated blood loss 25 cc  Anesthesia General via endotracheal tube   Surgeon Dr. Franz  Assistant Alvarado Tello  Specimen appendix and peritoneal culture to pathology  Findings perforated retrocecal appendicitis, the appendix appeared to be gangrenous and portions  Procedure after satisfactory induction of general anesthesia via endotracheal tube the patient's abdomen was prepped and draped in sterile fashion.  He was straight cathed.  Skin incision was made in the vertical midline infraumbilically carried down through the skin and subcutaneous tissue.  Fascia was controlled medially and laterally between 0 Ethibond stay sutures.  Fascia and peritoneum were divided Portia trocar was placed within the abdomen under direct vision.  Abdomen was insufflated to 14 mmHg with use of CO2.  He had inflammation in the right lower quadrant lateral and behind the cecum.  There is moderate amount of edema, the base appendix was identified.  5 mm ports x2 were placed 1 in the lower midline 1 in the left lower quadrant under direct vision after each site of been locally infiltrated with quarter percent Marcaine with epinephrine.  The base appendix was able to be controlled circumferentially was fired across with Endo LIZZIE V.  The appendix went retrocecally and we mobilized the proximal appendix and the distal appendix between 5 mm hemoclips.  Additional 5 mm port was placed in the right middle quadrant under direct vision after the site at been locally infiltrated with quarter percent Marcaine with epinephrine this allowed placement of a retractor on the cecum.  We were unable to get the midportion of the appendix elevated.  There is evidence of distal perforation cultures were taken aerobically and anaerobically.   Because we were unable to get the midportion of the appendix dissected out it was elected to convert to an open.  5 mm ports sequentially pulled back all were hemostatic.  Midline skin incision was extended cephalad and caudad.  Portia trocar was removed.  This fascia was divided cephalad and caudad peritoneum was divided.  The bowel was packed off Bookwalter retractor was placed.  The midportion of the appendix was then dissected out.  Mesentery was divided tween 5 mm hemoclips and 2-0 silk free ties where appropriate.  The staple line on the appendiceal stump appeared to be intact.  Appendix was completely mobilized and was passed off.  Right lower quadrant was liberally irrigated hemostasis was assured.  10 Turkmen round Roc drain was brought out the right sided 5 mm trocar site was placed adjacent to the cecum adjacent to the appendiceal stump and down into the pelvis.  Drain was sutured to the skin with use of 2-0 silk.  All counts were correct.  Fascia was closed in a running simple fashion with use of #1 Prolene starting in each corner and tying in the center of the wound.  Subcutaneous tissues liberally irrigated hemostasis assured and skin incisions were closed with widely spaced skin staples.  Quarter inch iodoform gauze was wick between the staples into the subcutaneous tissue with a continuous loop.  Drain was hooked to self bulb suction wounds were cleaned and dried and sterilely dressed.  The patient tolerated procedure well was transferred to the recovery room in stable condition.  He will be admitted to a full admit for further care and intravenous antibiotics

## 2022-03-01 NOTE — H&P
I was asked to see this patient by the emergency room physician Dr. Villa  Chief complaint right lower quadrant abdominal pain  History of present illness  phone was used.  He complains of 3-day history of right lower quadrant abdominal pain.  He has had some nausea and vomiting and some fevers or chills.  He denies similar problems in the past.  He has been taking Tylenol for this at home.  Past medical history is essentially negative he denies any prior medical problems denies any medications other than Tylenol.  He denies any prior surgeries he denies any allergies to medications  Social history he does not smoke he drinks a small amount of alcohol  Family history is negative  Review of systems is otherwise negative  Physical exam this is an alert  male in no active distress  /70   Pulse 110   Temp 98.8 °F (37.1 °C) (Oral)   Resp 16   Wt 72.4 kg (159 lb 11.2 oz)   SpO2 98%   Heart shows an increased rate normal rhythm lungs are clear and equal.  He is moderate to marked right lower quadrant tenderness.  CT scan of the abdomen was reviewed by myself shows marked dilatation and inflammation of the appendix.  I reviewed this with Dr. Pierre of radiology and he does not see a phlegmon.  There is no evidence of an abscess.  The test is negative  White blood count is elevated 13.09 hemoglobin is 14.7 hematocrit is 43.3 platelet count is 209  Glucose is 111 sodium is 135 electrolytes are normal liver function tests are normal  Urinalysis is clear  Assessment acute appendicitis  Plan n.p.o. SCDs IV antibiotics we will proceed with a laparoscopic appendectomy possible open.  I did discuss the possible need for a colectomy as well.  Risk benefits and options were discussed in detail the patient understands and wishes to proceed

## 2022-03-02 LAB
ANION GAP SERPL CALCULATED.3IONS-SCNC: 10.4 MMOL/L (ref 5–15)
BASOPHILS # BLD AUTO: 0.03 10*3/MM3 (ref 0–0.2)
BASOPHILS NFR BLD AUTO: 0.3 % (ref 0–1.5)
BUN SERPL-MCNC: 10 MG/DL (ref 6–20)
BUN/CREAT SERPL: 13 (ref 7–25)
CALCIUM SPEC-SCNC: 9.3 MG/DL (ref 8.6–10.5)
CHLORIDE SERPL-SCNC: 100 MMOL/L (ref 98–107)
CO2 SERPL-SCNC: 25.6 MMOL/L (ref 22–29)
CREAT SERPL-MCNC: 0.77 MG/DL (ref 0.76–1.27)
DEPRECATED RDW RBC AUTO: 41.2 FL (ref 37–54)
EGFRCR SERPLBLD CKD-EPI 2021: 132.3 ML/MIN/1.73
EOSINOPHIL # BLD AUTO: 0 10*3/MM3 (ref 0–0.4)
EOSINOPHIL NFR BLD AUTO: 0 % (ref 0.3–6.2)
ERYTHROCYTE [DISTWIDTH] IN BLOOD BY AUTOMATED COUNT: 11.9 % (ref 12.3–15.4)
GLUCOSE SERPL-MCNC: 135 MG/DL (ref 65–99)
HCT VFR BLD AUTO: 37.7 % (ref 37.5–51)
HGB BLD-MCNC: 12.9 G/DL (ref 13–17.7)
IMM GRANULOCYTES # BLD AUTO: 0.02 10*3/MM3 (ref 0–0.05)
IMM GRANULOCYTES NFR BLD AUTO: 0.2 % (ref 0–0.5)
LYMPHOCYTES # BLD AUTO: 1.03 10*3/MM3 (ref 0.7–3.1)
LYMPHOCYTES NFR BLD AUTO: 9.7 % (ref 19.6–45.3)
MCH RBC QN AUTO: 31.5 PG (ref 26.6–33)
MCHC RBC AUTO-ENTMCNC: 34.2 G/DL (ref 31.5–35.7)
MCV RBC AUTO: 92.2 FL (ref 79–97)
MONOCYTES # BLD AUTO: 1.09 10*3/MM3 (ref 0.1–0.9)
MONOCYTES NFR BLD AUTO: 10.2 % (ref 5–12)
NEUTROPHILS NFR BLD AUTO: 79.6 % (ref 42.7–76)
NEUTROPHILS NFR BLD AUTO: 8.5 10*3/MM3 (ref 1.7–7)
PLATELET # BLD AUTO: 205 10*3/MM3 (ref 140–450)
PMV BLD AUTO: 9.3 FL (ref 6–12)
POTASSIUM SERPL-SCNC: 4 MMOL/L (ref 3.5–5.2)
RBC # BLD AUTO: 4.09 10*6/MM3 (ref 4.14–5.8)
SODIUM SERPL-SCNC: 136 MMOL/L (ref 136–145)
WBC NRBC COR # BLD: 10.67 10*3/MM3 (ref 3.4–10.8)

## 2022-03-02 PROCEDURE — 85025 COMPLETE CBC W/AUTO DIFF WBC: CPT | Performed by: SURGERY

## 2022-03-02 PROCEDURE — 94799 UNLISTED PULMONARY SVC/PX: CPT

## 2022-03-02 PROCEDURE — 99024 POSTOP FOLLOW-UP VISIT: CPT | Performed by: SURGERY

## 2022-03-02 PROCEDURE — 80048 BASIC METABOLIC PNL TOTAL CA: CPT | Performed by: SURGERY

## 2022-03-02 PROCEDURE — 25010000002 ENOXAPARIN PER 10 MG: Performed by: SURGERY

## 2022-03-02 PROCEDURE — 25010000002 PIPERACILLIN SOD-TAZOBACTAM PER 1 G: Performed by: SURGERY

## 2022-03-02 RX ADMIN — OXYCODONE HYDROCHLORIDE AND ACETAMINOPHEN 1 TABLET: 5; 325 TABLET ORAL at 09:31

## 2022-03-02 RX ADMIN — OXYCODONE HYDROCHLORIDE AND ACETAMINOPHEN 1 TABLET: 5; 325 TABLET ORAL at 13:48

## 2022-03-02 RX ADMIN — PIPERACILLIN AND TAZOBACTAM 4.5 G: 4; .5 INJECTION, POWDER, LYOPHILIZED, FOR SOLUTION INTRAVENOUS; PARENTERAL at 04:23

## 2022-03-02 RX ADMIN — OXYCODONE HYDROCHLORIDE AND ACETAMINOPHEN 1 TABLET: 5; 325 TABLET ORAL at 18:11

## 2022-03-02 RX ADMIN — OXYCODONE HYDROCHLORIDE AND ACETAMINOPHEN 1 TABLET: 5; 325 TABLET ORAL at 04:20

## 2022-03-02 RX ADMIN — ENOXAPARIN SODIUM 40 MG: 40 INJECTION SUBCUTANEOUS at 13:01

## 2022-03-02 RX ADMIN — PIPERACILLIN AND TAZOBACTAM 4.5 G: 4; .5 INJECTION, POWDER, LYOPHILIZED, FOR SOLUTION INTRAVENOUS; PARENTERAL at 13:01

## 2022-03-02 RX ADMIN — SODIUM CHLORIDE, POTASSIUM CHLORIDE, SODIUM LACTATE AND CALCIUM CHLORIDE 100 ML/HR: 600; 310; 30; 20 INJECTION, SOLUTION INTRAVENOUS at 10:51

## 2022-03-02 RX ADMIN — PIPERACILLIN AND TAZOBACTAM 4.5 G: 4; .5 INJECTION, POWDER, LYOPHILIZED, FOR SOLUTION INTRAVENOUS; PARENTERAL at 20:13

## 2022-03-02 NOTE — PLAN OF CARE
Goal Outcome Evaluation:  Plan of Care Reviewed With: patient           Outcome Summary: Pt's VSS stable. Pt is ambulating to bathroom. RAVEN drain has minimal outpt, bloody. Pt's significant other is at bedside. IV fluids and antibiotics infusing at this time. Pt urinated. Pt's pain is decreasing.

## 2022-03-02 NOTE — CASE MANAGEMENT/SOCIAL WORK
Discharge Planning Assessment   Raquel John     Patient Name: Juan Michaels  MRN: [id number]  Today's Date: 3/2/2022    Admit Date: 3/1/2022     Discharge Needs Assessment     Row Name 03/02/22 1441       Living Environment    Lives With significant other; parent(s)    Current Living Arrangements home/apartment/condo    Primary Care Provided by self    Provides Primary Care For no one    Family Caregiver if Needed significant other    Quality of Family Relationships involved; supportive    Able to Return to Prior Arrangements yes       Resource/Environmental Concerns    Resource/Environmental Concerns none    Transportation Concerns car, none       Transition Planning    Patient/Family Anticipates Transition to home with family    Patient/Family Anticipated Services at Transition none    Transportation Anticipated family or friend will provide       Discharge Needs Assessment    Readmission Within the Last 30 Days no previous admission in last 30 days    Equipment Currently Used at Home none    Concerns to be Addressed no discharge needs identified    Equipment Needed After Discharge none    Provided Post Acute Provider List? N/A    Provided Post Acute Provider Quality & Resource List? N/A               Discharge Plan     Row Name 03/02/22 1443       Plan    Plan plan home with family    Patient/Family in Agreement with Plan yes    Plan Comments Spoke with patient at bedside, he is sitting up in recliner, permission to speak to patients s/o, Chepe Ojeda, who is at bedside. Chepe speaks english. Face sheet verifed. Patient lives in an apartment with Chepe and her father. He is independent of ADLs including working and driving. He has not used DME/HH/Rehab. He does not have a living will and is not interested in creating one. [facility]. He does not have a PCP - information for the [facility] provided. Chasity/Boy has screened patient for medicaid pending. CM # placed  on white board, will follow to assist with dc needs.              Continued Care and Services - Admitted Since 3/1/2022    Coordination has not been started for this encounter.          Demographic Summary     Row Name 03/02/22 8947       General Information    Admission Type inpatient    Arrived From home    Referral Source admission list    Reason for Consult discharge planning    Preferred Language English     Used During This Interaction yes  patients s/o, lisha Mina       Contact Information    Permission Granted to Share Info With                Functional Status    No documentation.                Psychosocial    No documentation.                Abuse/Neglect    No documentation.                Legal    No documentation.                Substance Abuse    No documentation.                Patient Forms    No documentation.                   William Bradshaw RN

## 2022-03-02 NOTE — PROGRESS NOTES
He is postoperative day #1.  He denies any flatus.  He is taking limited p.o. clear liquids.  He is urinating without problems.  His temperature has improved.  His white blood count is now normal.  His cultures are pending.  He is ambulating.   phone was used.  Continue intravenous antibiotics hold off on advancing his diet today.  We will start daily dressing changes with iodoform gauze

## 2022-03-02 NOTE — PLAN OF CARE
Goal Outcome Evaluation:  Plan of Care Reviewed With: patient        Progress: improving  Outcome Summary: vss. Dressing changed per orders- ARVEN drain with serrosangeous output noted. pt a&o x4 with significant other at bedside.  iv antibiotics in place. iv fluids in place. Pain controlled with po pain medication. Pt ambulated in hallway multiple times this shift. no other complaints at this time.

## 2022-03-03 LAB
BACTERIA SPEC AEROBE CULT: ABNORMAL
GRAM STN SPEC: ABNORMAL
GRAM STN SPEC: ABNORMAL

## 2022-03-03 PROCEDURE — 99024 POSTOP FOLLOW-UP VISIT: CPT | Performed by: SURGERY

## 2022-03-03 PROCEDURE — 25010000002 PIPERACILLIN SOD-TAZOBACTAM PER 1 G: Performed by: SURGERY

## 2022-03-03 PROCEDURE — 94799 UNLISTED PULMONARY SVC/PX: CPT

## 2022-03-03 PROCEDURE — 25010000002 ENOXAPARIN PER 10 MG: Performed by: SURGERY

## 2022-03-03 RX ADMIN — OXYCODONE HYDROCHLORIDE AND ACETAMINOPHEN 1 TABLET: 5; 325 TABLET ORAL at 13:11

## 2022-03-03 RX ADMIN — PIPERACILLIN AND TAZOBACTAM 4.5 G: 4; .5 INJECTION, POWDER, LYOPHILIZED, FOR SOLUTION INTRAVENOUS; PARENTERAL at 20:23

## 2022-03-03 RX ADMIN — OXYCODONE HYDROCHLORIDE AND ACETAMINOPHEN 1 TABLET: 5; 325 TABLET ORAL at 05:36

## 2022-03-03 RX ADMIN — OXYCODONE HYDROCHLORIDE AND ACETAMINOPHEN 1 TABLET: 5; 325 TABLET ORAL at 00:34

## 2022-03-03 RX ADMIN — ENOXAPARIN SODIUM 40 MG: 40 INJECTION SUBCUTANEOUS at 13:12

## 2022-03-03 RX ADMIN — OXYCODONE HYDROCHLORIDE AND ACETAMINOPHEN 1 TABLET: 5; 325 TABLET ORAL at 20:23

## 2022-03-03 RX ADMIN — SODIUM CHLORIDE, POTASSIUM CHLORIDE, SODIUM LACTATE AND CALCIUM CHLORIDE 100 ML/HR: 600; 310; 30; 20 INJECTION, SOLUTION INTRAVENOUS at 10:26

## 2022-03-03 RX ADMIN — PIPERACILLIN AND TAZOBACTAM 4.5 G: 4; .5 INJECTION, POWDER, LYOPHILIZED, FOR SOLUTION INTRAVENOUS; PARENTERAL at 04:34

## 2022-03-03 RX ADMIN — PIPERACILLIN AND TAZOBACTAM 4.5 G: 4; .5 INJECTION, POWDER, LYOPHILIZED, FOR SOLUTION INTRAVENOUS; PARENTERAL at 13:11

## 2022-03-03 NOTE — PROGRESS NOTES
Chief Complaint: POD # 2    Subjective   Patient ambulating in the riddle, patient tolerating clear liquids, no flatus and no BM  Objective     Vital signs in last 24 hours:  Temp:  [97.7 °F (36.5 °C)-98.5 °F (36.9 °C)] 98.5 °F (36.9 °C)  Heart Rate:  [93-99] 99  Resp:  [16] 16  BP: (111-119)/(65-72) 117/72    Intake/Output last 3 shifts:  I/O last 3 completed shifts:  In: 1510 [P.O.:560; I.V.:950]  Out: 52 [Drains:52]    Intake/Output this shift:  I/O this shift:  In: 120 [P.O.:120]  Out: -     Physical Exam:  Respiratory: CTA, good inspiratory effort  CV: RRR  Abd: Hypoactive BS, soft, ND, usual post-op tenderness, no rebound, no guarding, incision incision looks good I have removed the iodoform, RAVEN is serosanguineous, 52 cc    Results from last 7 days   Lab Units 03/02/22  0559   WBC 10*3/mm3 10.67   HEMOGLOBIN g/dL 12.9*   HEMATOCRIT % 37.7   PLATELETS 10*3/mm3 205     Results from last 7 days   Lab Units 03/02/22  0559   SODIUM mmol/L 136   POTASSIUM mmol/L 4.0   CHLORIDE mmol/L 100   CO2 mmol/L 25.6   BUN mg/dL 10   CREATININE mg/dL 0.77   GLUCOSE mg/dL 135*   CALCIUM mg/dL 9.3         Assessment/Plan   S/P laparoscopic converted to an open appendectomy  Pathology is pending  Expected postoperative ileus -await bowel function  Continue IV antibiotics    Tika Ragland MD  General, Minimally Invasive and Endoscopic Surgery  [facility]    [address]    [address]  [place] [address]               [place] [address]    P: [phone]  F: [phone]    Cc:  Provider, No Known

## 2022-03-03 NOTE — CASE MANAGEMENT/SOCIAL WORK
Continued Stay Note  DAREN Moncada     Patient Name: Juan Michaels  MRN: [id number]  Today's Date: 3/3/2022    Admit Date: 3/1/2022     Discharge Plan     Row Name 03/03/22 1349       Plan    Plan plan home with s/o & family    Plan Comments Follow up visit, patient is resting in bed with s/o at bedside. Continued c/o abd pain, RN notified. No additional needs at this time. CM will continue to follow.               Discharge Codes    No documentation.                     William Bradshaw RN

## 2022-03-03 NOTE — PLAN OF CARE
Goal Outcome Evaluation:  Plan of Care Reviewed With: patient        Progress: improving  Outcome Summary: Pt up in chair, ambulate in riddle several times this shift. Continue IVFs, IV antibiotics. PO pain meds providing adequate pain control. Remain on clear liquid diet.

## 2022-03-03 NOTE — PLAN OF CARE
Goal Outcome Evaluation:  Plan of Care Reviewed With: patient           Outcome Summary: Pt's pain is decreasing. Pt has abulated multiple times during shift. RAVEN drain outpt is decreasing. Pt's s/o is at bedside assisting with translation. Pt still tolerating PO pain meds and IV abx therapy. Pt is resting at this time.

## 2022-03-04 LAB
LAB AP CASE REPORT: NORMAL
PATH REPORT.FINAL DX SPEC: NORMAL
PATH REPORT.GROSS SPEC: NORMAL

## 2022-03-04 PROCEDURE — 25010000002 ENOXAPARIN PER 10 MG: Performed by: SURGERY

## 2022-03-04 PROCEDURE — 94799 UNLISTED PULMONARY SVC/PX: CPT

## 2022-03-04 PROCEDURE — 25010000002 PIPERACILLIN SOD-TAZOBACTAM PER 1 G: Performed by: SURGERY

## 2022-03-04 PROCEDURE — 99024 POSTOP FOLLOW-UP VISIT: CPT | Performed by: SURGERY

## 2022-03-04 RX ADMIN — SODIUM CHLORIDE, POTASSIUM CHLORIDE, SODIUM LACTATE AND CALCIUM CHLORIDE 100 ML/HR: 600; 310; 30; 20 INJECTION, SOLUTION INTRAVENOUS at 08:08

## 2022-03-04 RX ADMIN — OXYCODONE HYDROCHLORIDE AND ACETAMINOPHEN 1 TABLET: 5; 325 TABLET ORAL at 10:35

## 2022-03-04 RX ADMIN — Medication 10 ML: at 21:39

## 2022-03-04 RX ADMIN — PIPERACILLIN AND TAZOBACTAM 4.5 G: 4; .5 INJECTION, POWDER, LYOPHILIZED, FOR SOLUTION INTRAVENOUS; PARENTERAL at 21:36

## 2022-03-04 RX ADMIN — PIPERACILLIN AND TAZOBACTAM 4.5 G: 4; .5 INJECTION, POWDER, LYOPHILIZED, FOR SOLUTION INTRAVENOUS; PARENTERAL at 05:14

## 2022-03-04 RX ADMIN — ENOXAPARIN SODIUM 40 MG: 40 INJECTION SUBCUTANEOUS at 12:29

## 2022-03-04 RX ADMIN — OXYCODONE HYDROCHLORIDE AND ACETAMINOPHEN 1 TABLET: 5; 325 TABLET ORAL at 05:14

## 2022-03-04 RX ADMIN — PIPERACILLIN AND TAZOBACTAM 4.5 G: 4; .5 INJECTION, POWDER, LYOPHILIZED, FOR SOLUTION INTRAVENOUS; PARENTERAL at 12:29

## 2022-03-04 NOTE — PROGRESS NOTES
He is postoperative day #3.   phone was used.  He is afebrile vital signs are stable.  He is tolerating clear liquids he has moved his bowels and is passing flatus.  His pain is controlled with oral pain medication.  We will increase him to a regular diet continue intravenous antibiotics.  His drain is putting out serous returns.  His pathology was discussed that showed a gangrenous perforated appendicitis without evidence absence of tumor.  Dr. Flores is covering for me this weekend

## 2022-03-04 NOTE — PLAN OF CARE
Goal Outcome Evaluation:  Plan of Care Reviewed With: patient           Outcome Summary: Pt vss stable. Continue IVF's and IVabx. Pt is tolerating PO pain meds and decreasing the fequency of needing them. Pt's family memeber still at bedside helping with translation and care.

## 2022-03-04 NOTE — CASE MANAGEMENT/SOCIAL WORK
Continued Stay Note  DAREN Moncada     Patient Name: Juan Michaels  MRN: [id number]  Today's Date: 3/4/2022    Admit Date: 3/1/2022     Discharge Plan     Row Name 03/04/22 1511       Plan    Plan Comments I spoke with the patient and his s/o at bedside.  We discussed his needs at discharge and I offered home health and other community resources.  He was provided information regarding the [facility] and denied any further needs. CM will continue to follow.               Discharge Codes    No documentation.                     Stacey Perez RN

## 2022-03-04 NOTE — PLAN OF CARE
Goal Outcome Evaluation:  Plan of Care Reviewed With: patient        Progress: improving  Outcome Summary: vss. pt s/l. diet advanced to regular diet. pt is tolerating PO pain meds. family member at bedside helping with translation/support. incision with staples, well approximated, scant drainage noted. RAVEN drain with non measurable serrosangeous output.

## 2022-03-05 VITALS
DIASTOLIC BLOOD PRESSURE: 72 MMHG | HEART RATE: 94 BPM | RESPIRATION RATE: 14 BRPM | WEIGHT: 159.1 LBS | SYSTOLIC BLOOD PRESSURE: 110 MMHG | OXYGEN SATURATION: 99 % | BODY MASS INDEX: 26.51 KG/M2 | HEIGHT: 65 IN | TEMPERATURE: 98.3 F

## 2022-03-05 PROBLEM — K35.32 ACUTE APPENDICITIS WITH PERFORATION AND LOCALIZED PERITONITIS: Status: RESOLVED | Noted: 2022-03-01 | Resolved: 2022-03-05

## 2022-03-05 PROBLEM — K35.30 ACUTE APPENDICITIS WITH LOCALIZED PERITONITIS, WITHOUT PERFORATION, ABSCESS, OR GANGRENE: Status: RESOLVED | Noted: 2022-03-01 | Resolved: 2022-03-05

## 2022-03-05 PROCEDURE — 25010000002 ENOXAPARIN PER 10 MG: Performed by: SURGERY

## 2022-03-05 PROCEDURE — 25010000002 PIPERACILLIN SOD-TAZOBACTAM PER 1 G: Performed by: SURGERY

## 2022-03-05 RX ORDER — OXYCODONE HYDROCHLORIDE AND ACETAMINOPHEN 5; 325 MG/1; MG/1
1 TABLET ORAL EVERY 4 HOURS PRN
Qty: 30 TABLET | Refills: 0 | Status: SHIPPED | OUTPATIENT
Start: 2022-03-05 | End: 2022-03-14

## 2022-03-05 RX ORDER — AMOXICILLIN AND CLAVULANATE POTASSIUM 875; 125 MG/1; MG/1
1 TABLET, FILM COATED ORAL 2 TIMES DAILY
Qty: 28 TABLET | Refills: 0 | Status: SHIPPED | OUTPATIENT
Start: 2022-03-05 | End: 2022-04-11

## 2022-03-05 RX ADMIN — PIPERACILLIN AND TAZOBACTAM 4.5 G: 4; .5 INJECTION, POWDER, LYOPHILIZED, FOR SOLUTION INTRAVENOUS; PARENTERAL at 05:47

## 2022-03-05 RX ADMIN — PIPERACILLIN AND TAZOBACTAM 4.5 G: 4; .5 INJECTION, POWDER, LYOPHILIZED, FOR SOLUTION INTRAVENOUS; PARENTERAL at 12:00

## 2022-03-05 RX ADMIN — OXYCODONE HYDROCHLORIDE AND ACETAMINOPHEN 1 TABLET: 5; 325 TABLET ORAL at 00:23

## 2022-03-05 RX ADMIN — ENOXAPARIN SODIUM 40 MG: 40 INJECTION SUBCUTANEOUS at 12:00

## 2022-03-05 NOTE — EXTERNAL PATIENT INSTRUCTIONS
Patient Education   Table of Contents       Acetaminophen; Oxycodone tablets (Acetaminophen; Oxycodone tablets)       Amoxicillin; Clavulanic Acid Tablets (Amoxicillin; Clavulanic Acid Tablets)       Apendicitis, en los adultos (Appendicitis, Adult)       Apendicectom?a abierta en los adultos (Open Appendectomy, Adult)       Apendicectom?a abierta en adultos, cuidados posteriores (Open Appendectomy, Adult, Care After)     To view videos and all your education online visit,   [email or web address]   or scan this QR code with your smartphone.                  Acetaminophen; Oxycodone tablets     ?Qué es marah medicamento?   El ACETAMINOFENO; OXICODONA es un analg?sico. Se usa para tratar el dolor moderado a severo.   Marah medicamento puede ser utilizado para otros usos; si tiene alguna pregunta consulte con ramirez proveedor de atenci?n m?dica o con ramirez farmac?utico.   MARCAS COMUNES: Endocet, Magnacet, Nalocet, Narvox, Percocet, Perloxx, Primalev, Primlev, Prolate, Roxicet, Xolox   ?Qué le kendal informar a mi profesional de la mercedes antes de jeffrey marah medicamento?   Necesitan saber si usted presenta alguno de los siguientes problemas o situaciones:   tumor cerebral abuso de drogas o drogadicci?n lesi?n de la summer enfermedad cardiaca si haresh alcohol con frecuencia  enfermedad renal   enfermedad hep?primitivo  funci?n disminuida de las gl?ndulas suprarrenales enfermedad pulmonar, asma o problema respiratorio convulsiones problemas estomacales o intestinales si ha usado medicamentos IMAO, tales glenda Marplan, Nardil o Parnate en los ?ltimos 14 d?as cosme reacci?n al?rgica o inusual al acetaminofeno, a la oxicodona, a otros medicamentos, alimentos, colorantes o conservantes si está embarazada o buscando quedar embarazada si está amamantando a un bebé   ?C?mo kendal utilizar marah medicamento?   [place] marah medicamento por v?a oral con un vaso lleno de agua. ?selo seg?n las instrucciones en la etiqueta. Puede tomarlo con o sin  alimentos. Si el medicamento le produce malestar estomacal, t?franz con alimentos. No lo use con cosme frecuencia mayor a la indicada. Es posible que haya dosis extras o sin usar en el frasco despu?s de terminar el tratamiento. Hable con ramirez proveedor de atenci?n m?dica si tiene alguna pregunta sobre ramirez dosis.   Ramirez farmac?utico le dará cosme Gu?a del medicamento especial (MedGuide, nombre en ingl?s) con cada receta y en cada ocasi?n que la vuelva a surtir. Aseg?rese de leer esta informaci?n cada vez cuidadosamente.   Hable con ramirez proveedor de atenci?n m?dica sobre el uso de marah medicamento en ni?os. Puede requerir atenci?n especial.   Los pacientes mayores de 65 a?os de edad pueden presentar reacciones m?s bernardo y necesitar dosis menores.   Sobredosis: P?ngase en contacto inmediatamente con un centro toxicol?gico o cosme brea de urgencia si usted jac que haya tomado demasiado medicamento.   ATENCI?N: Marah medicamento es solo para usted. No comparta marah medicamento con nadie.   ?Qué sucede si me olvido de cosme dosis?   No se aplica en marah marcie. Marah medicamento no es para uso regular. Solamente debe usarse seg?n sea necesario.   ?Qué puede interactuar con marah medicamento?   Esta medicina puede interactuar con los siguientes medicamentos:   alcohol antihistam?nicos para alergia, tos y resfr?o medicamentos antivirales para el VIH o SIDA atropina ciertos antibi?ticos, tales glenda claritromicina, eritromicina, linezolida, rifampicina ciertos medicamentos para la ansiedad o para conciliar el yamilka?o ciertos medicamentos para problemas de vejiga, tales glenda oxibutinina, tolterodina ciertos medicamentos para la depresi?n, glenda amitriptilina, fluoxetina, sertralina ciertos medicamentos para infecciones heather?masood, tales glenda quetoconazol, itraconazol, voriconazol ciertos medicamentos para la migra?a, tales glenda almotript?n, eletript?n, frovatript?n, naratript?n, rizatript?n, sumatript?n, zolmitript?n ciertos medicamentos para las  n?useas o los v?mitos, tales glenda dolasetr?n, ondansetr?n, palonosetr?n ciertos medicamentos para el mal de Parkinson, tales glenda benzatropina, trihexifenidilo ciertos medicamentos para convulsiones, tales glenda fenobarbital, fenito?na, primidona ciertos medicamentos para problemas estomacales, tales glenda diciclomina, hiosciamina ciertos medicamentos para el mareo por movimiento, epi glenda escopolamina diur?ticos anest?sicos generales, tales glenda halotano, isoflurano, metoxiflurano, propofol ipratropio anest?sicos locales, tales glenda lidoca?na, pramoxina, tetraca?na IMAO, tales glenda Carbex, Eldepryl, Marplan, Nardil y Parnate medicamentos para relajar los m?sculos antes de cosme cirug?a avinash de metileno nilotinib otros medicamentos con acetaminofeno otros medicamentos narc?ticos para el dolor o la tos fenotiazinas, tales glenda clorpromazina, mesoridazina, proclorperazina, tioridazina   Puede ser que esta lista no menciona todas las posibles interacciones. Informe a ramirez profesional de la mercedes de todos los productos a base de hierbas, medicamentos de venta bashir o suplementos nutritivos que esté tomando. Si usted fuma, consume bebidas alcoh?licas o si utiliza drogas ilegales, ind?queselo tambi?n a ramirez profesional de la mercedes. Algunas sustancias pueden interactuar con ramirez medicamento.   ?A qué kendal estar atento al usar marah medicamento?   Informe a ramirez proveedor de atenci?n m?dica si el dolor no desaparece, si empeora, o si tiene un dolor nuevo o diferente. Es posible que desarrolle tolerancia a marah f?rmaco. Tolerancia significa que necesitará cosme dosis m?s namrata del f?rmaco para aliviar el dolor. La tolerancia es normal y previsible si usa marah f?rmaco por mucho tiempo.   Existen distintos tipos de f?rmacos narc?ticos (opioides) para el dolor. Si usa m?s de un tipo a la vez, es posible que tenga m?s efectos secundarios. Entr?guele a ramirez proveedor de atenci?n m?dica cosme lista de todos los f?rmacos que usa. ?l o alberto le dirá qué  cantidad debe usar del f?rmaco. No use m?s f?rmaco de lo indicado. Busque ayuda de emergencia de inmediato si tiene problemas para respirar.   No deje de usar ramirez f?rmaco repentinamente porque puede desarrollar cosme reacci?n grave. Ramirez cuerpo se acostumbra al f?rmaco. Hissop NO significa que usted es adicto. La adicci?n es cosme conducta relacionada a la obtenci?n y el uso de cosme droga por razones que no son m?dicas. Si usted tiene dolor, tiene cosme mariah?n m?dica para usar el f?rmaco. Ramirez proveedor de atenci?n m?dica le dirá cu?nto f?rmaco usar. Si ramirez proveedor de atenci?n m?dica desea que deje de usar el f?rmaco, la dosis se irá disminuyendo lentamente ean un tiempo para evitar cualquier efecto secundario.   Hable con ramirez proveedor de atenci?n m?dica sobre la naloxona y c?mo obtenerla. La naloxona es un f?rmaco de emergencia usado para tratar cosme sobredosis de opioides. Puede producirse cosme sobredosis si usted utiliza demasiada cantidad de un opioide. Tambi?n puede suceder si se usa un opioide con algunos otros f?rmacos o sustancias, tales glenda alcohol. Conozca los s?ntomas de cosme sobredosis, tales glenda dificultad para respirar, cansancio o somnolencia inusual, o no poder responder o despertarse. Aseg?rese de decirles a los cuidadores y a los contactos cercanos d?nde está guardada. Aseg?rese de que sepan c?mo usarla. Despu?s de que se administra la naloxona, debe recibir ayuda de emergencia de inmediato. La naloxona es un tratamiento temporal. Es posible que se necesiten varias dosis.   No use otros f?rmacos que contengan acetaminofeno con marah f?rmaco. Muchos f?rmacos de venta bashir contienen acetaminofeno. Siempre alistair atentamente las etiquetas. Si tiene alguna pregunta, consulte a ramirez proveedor de atenci?n m?dica.   Si usa demasiado acetaminofeno, busque ayuda m?dica de inmediato. Demasiado acetaminofeno puede ser muy peligroso y causar da?o hep?amirah. Incluso si no tiene s?ntomas, es importante obtener ayuda de  inmediato.   Suys f?rmaco no previene un ataque al coraz?n ni un accidente cerebrovascular. Susy f?rmaco puede aumentar la posibilidad de padecer un ataque cardiaco o un accidente cerebrovascular. La posibilidad puede aumentar con el uso prolongado de susy f?rmaco o si usted tiene enfermedad cardiaca. Si está tomando aspirina para la prevenci?n de ataques cardiacos o accidentes cerebrovasculares, hable con ramirez proveedor de atenci?n m?dica sobre el uso de susy f?rmaco.   Puede experimentar somnolencia o mareos. No conduzca, no utilice maquinaria ni betty nada que le exija permanecer en estado de alerta hasta que sepa c?mo le afecta susy f?rmaco. No se siente ni se ponga de pie con rapidez, especialmente si es un paciente de edad avanzada. [place] reduce el riesgo de mareos o desmayos. El alcohol puede interferir con el efecto de susy f?rmaco. Evite consumir bebidas alcoh?licas.   Susy f?rmaco causará estre?imiento. Si no evacua los intestinos ean 3 d?as, llame a ramirez proveedor de atenci?n m?dica.   Se le podr?a secar la boca. Masticar chicle sin az?car o chupar caramelos duros y beber agua en abundancia le ayudará a mantener la boca h?mendoza. Si el problema no desaparece o es severo, consulte a ramirez proveedor de atenci?n m?dica.   ?Qué efectos secundarios puedo tener al utilizar susy medicamento?   Efectos secundarios que debe informar a ramirez m?dico o a ramirez profesional de la mercedes tan pronto gledna sea posible:   reacciones al?rgicas (erupci?n cut?ravinder, comez?n/picaz?n o urticaria; hinchaz?n de la kendal, los labios o la lengua) confusi?n lesi?n renal (dificultad para orinar o cambios en la cantidad de orina) heces claras lesi?n en el h?gado (orina amarilla oscura o anny?n; sensaci?n general de estar enfermo o s?ntomas gripales; p?rdida del apetito; dolor en la reinier?n abdominal superior derecha; debilidad o cansancio inusuales; color amarillento de los ojos o la piel) funci?n disminuida de las gl?ndulas suprarrenales (n?useas;  v?ann; p?rdida del apetito; debilidad o cansancio inusuales; mareo; presi?n arterial baja) presi?n arterial baja (mareos; sensaci?n de desmayo o aturdimiento; ca?zeng; debilidad o cansancio inusuales) enrojecimiento, formaci?n de ampollas, descamaci?n o distensi?n de la piel, incluso dentro de la boca s?ndrome serotonin?rgico (de la serotonina) (irritabilidad; confusi?n; diarrea; frecuencia cardiaca r?pida o irregular; tics musculares; m?sculos r?gidos; problemas para caminar; sudoraci?n; fiebre namrata; convulsiones; escalofr?os; v?ann) problemas para respirar   Efectos secundarios que generalmente no requieren atenci?n m?dica (inf?rmelos a ramirez m?dico o a ramirez profesional de la mercedes si persisten o si son molestos):   estre?imiento boca seca n?useas, v?ann cansancio   Puede ser que esta lista no menciona todos los posibles efectos secundarios. Comun?quese a ramirez m?dico por asesoramiento m?dico sobre los efectos secundarios. Usted puede informar los efectos secundarios a la FDA por tel?fono al [phone].   ?D?nde kendal guardar mi medicina?   Mantenga fuera del alcance de ni?os y mascotas. Existe la posibilidad de abusar de susy medicamento. Mant?ngalo en un lugar seguro para protegerlo contra robos. No lo comparta con nadie. Es solamente para usted. Es peligroso  o regalar susy medicamento, y está prohibido por la bg.   Guarde a temperatura ambiente, entre 20 y 25 grados Celsius (68 y 77 grados Fahrenheit). Proteja bailey. Deseche todo el medicamento que no haya utilizado despu?s de la fecha de vencimiento.   Susy medicamento puede causar da?os y la muerte si lo usan otros adultos, ni?os o mascotas. Es importante desechar el medicamento tan pronto no lo necesite m?s o haya vencido. Puede hacerlo de dos formas:   Lleve el medicamento a un programa de recuperaci?n de medicamentos. Consulte con ramirez farmacia o con cosme entidad reguladora para encontrar un lugar donde llevarlo. Si no puede devolver el medicamento,  arr?jelo por el sanitario.   ATENCI?N: Susy folleto es un resumen. Puede ser que no cubra toda la posible informaci?n. Si usted tiene preguntas acerca de esta medicina, consulte con ramirez m?dico, ramirez farmac?utico o ramirez profesional de la mercedes.     ? 2021 Elsevier/Gold Standard (2021-12-01 00:00:00)         Amoxicillin; Clavulanic Acid Tablets     ?Qué es susy medicamento?   AMOXICILINA; ?CIDO CLAVUL?ALVINA es un antibi?amirah del helene de las penicilinas. Se usa para tratar ciertas infecciones causadas por bacterias. No es efectivo para resfr?os, gripe u otros virus.   Susy medicamento puede ser utilizado para otros usos; si tiene alguna pregunta consulte con ramirez proveedor de atenci?n m?dica o con ramirez farmac?utico.   MARCAS COMUNES: Augmentin   ?Qué le kendal informar a mi profesional de la mercedes antes de jeffrey susy medicamento?   Necesitan saber si usted presenta alguno de los siguientes problemas o situaciones:   enfermedad renal enfermedad hep?primitivo mononucleosis problemas estomacales o intestinales glenda colitis cosme reacci?n al?rgica o inusual a la amoxicilina, a otras penicilinas, a antibi?ticos del helene de las cefalosporinas, al ?cido clavul?alvina, a otros medicamentos, alimentos, colorantes o conservantes si está embarazada o buscando quedar embarazada si está amamantando a un bebé   ?C?mo kendal utilizar susy medicamento?   [place] susy f?rmaco por v?a oral. ?selo seg?n las instrucciones en la etiqueta a la misma hora todos los d?as. T?franz con alimentos al inicio de cosme comida o un refrigerio. Use todo susy f?rmaco a menos que ramirez proveedor de atenci?n m?dica le diga que deje de usarlo antes de terminarlo. Siga us?ndolo incluso si jac que está mejor.   Hable con ramirez proveedor de atenci?n m?dica sobre el uso de susy f?rmaco en ni?os. Aunque se puede recetar para ciertas afecciones, existen precauciones que deben tomarse.   Sobredosis: P?ngase en contacto inmediatamente con un centro toxicol?gico o cosme brea de urgencia si usted  jac que haya tomado demasiado medicamento.   ATENCI?N: Susy medicamento es solo para usted. No comparta susy medicamento con nadie.   ?Qué sucede si me olvido de cosme dosis?   Si olvida cosme dosis, admin?strela lo antes posible. Si es lo la hora de la pr?xima dosis, administre solo twila dosis. No se administre dosis adicionales o dobles.   ?Qué puede interactuar con susy medicamento?         alopurinol       anticoagulantes       p?ldoras anticonceptivas       metotrexato       probenecid     Puede ser que esta lista no menciona todas las posibles interacciones. Informe a ramirez profesional de la mercedes de todos los productos a base de hierbas, medicamentos de venta bashir o suplementos nutritivos que esté tomando. Si usted fuma, consume bebidas alcoh?licas o si utiliza drogas ilegales, ind?queselo tambi?n a ramirez profesional de la mercedes. Algunas sustancias pueden interactuar con ramirez medicamento.   ?A qué kendal estar atento al usar susy medicamento?   Informe a ramirez proveedor de atenci?n m?dica si alina s?ntomas no comienzan a mejorar o si empeoran.   Susy medicamento puede causar reacciones graves en la piel. Pueden suceder semanas a meses despu?s de comenzar a usar el medicamento. Contacte a ramirez proveedor de atenci?n m?dica de inmediato si nota que tiene fiebre o s?ntomas gripales con cosme erupci?n. La erupci?n puede ser nestor o morada, y luego puede convertirse en ampollas o descamaci?n de la piel. O leonardo, es posible que observe cosme erupci?n nestor con hinchaz?n en la kendal, los labios o los ganglios linf?ticos en el leatha o debajo de los brazos.   No trate la diarrea con productos de venta bashir. Contacte a ramirez proveedor de atenci?n m?dica si tiene diarrea por m?s de 2 d?as, o si es grave y acuosa.   Si tiene diabetes, es posible que obtenga un resultado falso-positivo para az?car en la orina. Consulte a ramirez proveedor de atenci?n m?dica.   Los anticonceptivos pueden no actuar correctamente mientras esté utilizando susy medicamento.  Hable con ramirez proveedor de atenci?n m?dica sobre el uso de un m?todo anticonceptivo adicional.   ?Qué efectos secundarios puedo tener al utilizar susy medicamento?   Efectos secundarios que debe informar a ramirez m?dico o a ramirez proveedor de atenci?n m?dica tan pronto glenda sea posible:   reacciones al?rgicas (erupci?n cut?ravinder, comez?n/picaz?n o urticaria; hinchaz?n de la kendal, los labios o la lengua) diarrea con kelly o acuosa orina oscura infecci?n (fiebre, escalofr?os, tos, dolor de garganta u otros nicole) lesi?n renal (dificultad para orinar o cambios en la cantidad de orina) enrojecimiento, formaci?n de ampollas, descamaci?n o distensi?n de la piel, incluso dentro de la boca convulsiones candidiasis bucal (manchas lenore en la boca o llagas en la boca) problemas para respirar sangrado o moretones inusuales debilidad o cansancio inusuales   Efectos secundarios que generalmente no requieren atenci?n m?dica (informe a ramirez m?dico o a ramirez proveedor de atenci?n m?dica si persisten o si son molestos):   diarrea mareos dolor de summer n?useas, v?ann flujo vaginal inusual, comez?n/picaz?n u olor malestar estomacal   Puede ser que esta lista no menciona todos los posibles efectos secundarios. Comun?quese a ramirez m?dico por asesoramiento m?dico sobre los efectos secundarios. Usted puede informar los efectos secundarios a la FDA por tel?fono al [phone].   ?D?nde kendal guardar mi medicina?   Mantenga fuera del alcance de los ni?os y mascotas.   Guarde a temperatura ambiente, entre 20 y 25 grados Celsius (68 y 77 grados Fahrenheit). Deseche todo el f?rmaco que no haya utilizado despu?s de la fecha de vencimiento.   ATENCI?N: Susy folleto es un resumen. Puede ser que no cubra toda la posible informaci?n. Si usted tiene preguntas acerca de esta medicina, consulte con ramirez m?dico, ramirez farmac?utico o ramirez profesional de la mercedes.     ? 2021 [employer] Standard (2021-12-01 00:00:00)         Apendicitis, en los adultos      Appendicitis, Adult      El ap?ndice es un tubo cil?ndrico en el cuerpo que tiene forma de dedo. Está conectado al intestino grueso. El t?rmino apendicitis significa que marah tubo cil?ndrico está hinchado (inflamado). Si no se trata, el tubo se puede desgarrar (ruptura). [place] puede derivar en cosme infecci?n potencialmente mortal. Esta afecci?n tambi?n puede causar la acumulaci?n de pus en el ap?ndice (absceso).     ?Cu?les son las causas?    Esta afecci?n puede deberse a algo que obstruye el ap?ndice. [place] incluye lo siguiente:       Cosme masa de materia fecal (heces).       Ganglios linf?ticos m?s grandes que lo normal.     Algunas veces, la causa no se conoce.   ?Qué incrementa el riesgo?   Es m?s probable que presente esta afecci?n si tiene entre 10 y 30 a?os de edad.   ?Cu?les son los signos o los s?ntomas?    Los s?ntomas de esta afecci?n incluyen:      Dolor alrededor del ombligo.       El dolor se traslada hacia la parte inferior derecha del vientre (abdomen).       El dolor puede empeorar con el tiempo.       El dolor puede empeorar al toser.       El dolor puede empeorar al moverse bruscamente.       Dolor a la palpaci?n en la michoacano inferior derecha del abdomen.       Ganas de vomitar (n?useas).       V?mitos.       Falta de apetito (inapetencia).       Fiebre.       Tener dificultad para defecar (estre?imiento).       Materia fecal l?quida (diarrea).       Malestar general.     ?C?mo se trata?   A veces, esta afecci?n se trata con antibi?ticos solamente. Mera lo m?s frecuente es que se trate con antibi?ticos y cirug?a para retirar el ap?ndice (apendicectom?a). Si solo se dan antibi?ticos y no se retira el ap?ndice, existe la posibilidad de que la afecci?n regrese.    Hay dos formas de retirar el ap?ndice:      Cirug?a abierta. Con marah m?todo, el ap?ndice se extirpa a sophai?s de un dorothy ivett (incisi?n). El dorothy se realiza en la michoacano inferior derecha del abdomen. Esta cirug?a se puede utilizar si:        Tiene cicatrices de otra cirug?a.       Tiene cosme enfermedad hemorr?gica.       Está embarazada, y el bebé nacerá pronto.       Tiene cosme enfermedad que no dificulta realizar el otro tipo de cirug?a.      Cirug?a laparosc?pica. Con marah m?todo, el ap?ndice se extirpa a sophia?s de antwan?os dunn. A menudo, esta cirug?a:       Causa menos dolor.       Causa menos problemas.       Se douglas m?s r?pido.      Si se produce la ruptura del ap?ndice y se forma pus:       Pueden colocarle un drenaje en la llaga. El drenaje se usará para eliminar el pus.       Pueden administrarle un antibi?amirah a sophia?s de un tubo (cat?ter) intravenoso.       La extirpaci?n del ap?ndice puede o no ser necesaria.     Siga estas instrucciones en ramirez casa:    Si le realizaron la cirug?a, siga las instrucciones de ramirez m?dico sobre c?mo:       Cuidarse a sí mismo en ramirez casa.       Cuidarse el dorothy de la cirug?a.     Medicamentos         Use los medicamentos de venta bashir y los recetados solamente glenda se lo haya indicado el m?dico.       Si le recetaron un antibi?amirah, t?franz glenda se lo haya indicado el m?dico. No  deje de tomarlo aunque comience a sentirse mejor.     Comida y bebida    Siga las indicaciones del m?dico respecto de lo que no puede comer o beber. Puede retornar lentamente a ramirez dieta si:       Ya no siente malestar en el est?andre.       [place] dejado de vomitar.     Instrucciones generales        No  consuma levar?n producto que contenga nicotina o tabaco, glenda cigarrillos, cigarrillos electr?nicos y tabaco de mascar. Si necesita ayuda para dejar de fumar, consulte al m?dico.      No  conduzca ni use maquinaria pesada mientras agueda analg?sicos recetados.      Consulte a ramirez m?dico si el medicamento que agueda puede causarle problemas para defecar. Es posible que deba jeffrey medidas para prevenir o tratar los problemas para defecar:       Beber suficiente l?quido para mantener el pis (orina) de color amarillo p?lido.       Usar medicamentos  recetados o de venta bashir.       [place] alimentos ricos en fibra. Entre ellos, frijoles, cereales integrales y frutas y verduras frescas.       Limitar los alimentos con alto contenido de grasa y az?car. Estos incluyen alimentos fritos o dulces.       Concurra a todas las visitas de seguimiento glenda se lo haya indicado el m?dico. [place] es importante.     Comun?quese con un m?dico si:         Observa pus, kelly o cosme gran cantidad de l?quido que sale del dorothy o de los dunn de la cirug?a.       Tiene malestar estomacal o v?mitos.     Solicite ayuda de inmediato si:         Siente dolor en el vientre, y el dolor empeora.       Tiene fiebre.       Tiene escalofr?os.       Se siente muy cansado.       Siente nicole musculares.       Le falta el aire.     Resumen         La apendicitis es la inflamaci?n del ap?ndice. El ap?ndice es un tubo cil?ndrico que tiene forma de dedo. Está conectado al intestino grueso.       Esta afecci?n puede deberse a algo que obstruye el ap?ndice. [place] puede causar cosme infecci?n.       Esta afecci?n suele tratarse con antibi?ticos y el retiro del ap?ndice.     Esta informaci?n no tiene glenda fin reemplazar el consejo del m?dico. Aseg?rese de hacerle al m?dico cualquier pregunta que tenga.     Document Released: 06/18/2013Document Revised: 06/11/2021Document Reviewed: 06/11/2021     Elsevier Patient Education ? 2021 Elsevier Inc.         Apendicectom?a abierta en los adultos     Open Appendectomy, Adult      Cosme apendicectom?a abierta es un procedimiento que se realiza para extirpar el ap?ndice a sophia?s de cosme incisi?n ivett. El ap?ndice es cosme estructura que tiene forma de dedo y está unido al intestino grueso.   La apendicectom?a puede realizarse para evitar la explosi?n (rotura) del ap?ndice inflamado. Tambi?n se puede llevar a cabo para tratar la infecci?n de un ap?ndice que se ha roto. Se suele realizar inmediatamente despu?s de que se diagnostica la inflamaci?n del ap?ndice  (apendicitis).     Informe al m?dico acerca de lo siguiente:         Cualquier alergia que tenga.       Todos los medicamentos que usa, incluidos vitaminas, hierbas, gotas oft?lmicas, cremas y medicamentos de venta bashir.       Cualquier problema previo que usted o alg?n miembro de ramirez steffen hayan tenido con los anest?sicos.       Cualquier trastorno de la kelly que tenga.       Cirug?as a las que se haya sometido.       Cualquier afecci?n m?dica que tenga.       Si está embarazada o podr?a estarlo.     ?Cu?les son los riesgos?    En general, se trata de un procedimiento seguro. Sin embargo, pueden ocurrir complicaciones, por ejemplo:       Infecci?n.       Sangrado.       Reacciones al?rgicas a los medicamentos.       Da?os a otras estructuras u ?rganos.       Formaci?n de pus (abscesos).       Dolor a jose maria plazo o cicatrices en el lugar de las incisiones o en el interior del abdomen.       Co?gulos de kelly en las piernas.     ?Qué ocurre antes del procedimiento?   Restricciones en las comidas y bebidas   Siga las indicaciones del m?dico respecto de las restricciones de comidas y bebidas. Posiblemente le indiquen que no coma ni susan nada [place] pronto glenda se realice el diagn?stico de apendicitis.   Medicamentos    Consulte al m?dico sobre:       Cambiar o suspender los medicamentos que agueda habitualmente. [place] es muy importante si agueda medicamentos para la diabetes o anticoagulantes.       Chet medicamentos glenda aspirina e ibuprofeno. Estos medicamentos pueden tener un efecto anticoagulante en la kelly. No  tome estos medicamentos a menos que el m?dico se lo indique.       Usar medicamentos de venta bashir, vitaminas, hierbas y suplementos.     Indicaciones generales         P?rodney a un adulto responsable que lo cuide ean al menos 24?horas despu?s de que le den el namrata del hospital. [place] es importante.       Le podr?n indicar antibi?ticos para prevenir infecciones o para tratar la inflamaci?n o infecci?n  existentes.       Preg?ntele al m?dico c?mo se marcará o identificará el lugar de la cirug?a.      Preg?ntele al m?dico qué medidas se jeffrey?n para ayudar a prevenir cosme infecci?n. Estas medidas pueden incluir:       Rasurar el vello del lugar de la cirug?a.       Adrienne la piel con un jab?n antis?ptico.       Recibir antibi?ticos.     ?Qué ocurre ean el procedimiento?         Le colocar?n cosme v?a intravenosa en cosme vena.      Le administrar?n obey o m?s de los siguientes medicamentos:       Un medicamento para ayudar a relajarse (sedante).       Un medicamento para adormecer la michoacano (anestesia local).       Un medicamento que lo hará dormir (anestesia general).       Pueden introducirle un tubo a sophia?s de la nariz hasta el est?andre (sonda NG o sonda nasog?strica) para drenar el contenido del est?andre.       Pueden colocarle un tubo flexible y haq (cat?ter) en la vejiga para drenar la orina.       Le mayur?n cosme incisi?n en la parte inferior derecha del abdomen.       Le extirpar?n el ap?ndice.       Le adrienne?n el abdomen para eliminar las bacterias.       La incisi?n se cerrará en varias capas con puntos (suturas), grapas o tiras adhesivas.       Se puede usar cosme venda (vendaje) para cubrir la incisi?n.       Si le colocaron cosme sonda en la vejiga o en el est?andre, esta se retirar?.     El procedimiento puede variar seg?n el m?dico y el hospital.   ?Qué ocurre despu?s del procedimiento?         Le controlar?n la presi?n arterial, la frecuencia card?baljit, la frecuencia respiratoria y el nivel de ox?paulo en la kelly hasta que se vaya del hospital.       Le hai?n analg?sicos seg?n sea necesario para controlar el dolor.      No  conduzca ean 24?horas si le administraron un sedante ean el procedimiento.      Si el ap?ndice se rompi?:       Le administrar?n antibi?ticos por cosme v?a intravenosa.       Es posible que lo env?en a ramirez casa con un drenaje temporal.     Resumen         Cosme apendicectom?a abierta  es un procedimiento que se realiza para extirpar el ap?ndice a sophia?s de cosme incisi?n ivett.       Es un procedimiento seguro, aidee existen algunos riesgos, entre ellos, sangrado, infecci?n, reacci?n al?rgica a los medicamentos o da?os a otros ?rganos.       Posiblemente le indiquen que no coma ni susan nada br[place]ford pronto glenda se realice el diagn?stico de apendicitis.       Despu?s del procedimiento, le controlar?n la presi?n arterial, la frecuencia card?baljit, la frecuencia respiratoria y el nivel de ox?paulo en la kelly hasta que se vaya del hospital.     Esta informaci?n no tiene glenda fin reemplazar el consejo del m?dico. Aseg?rese de hacerle al m?dico cualquier pregunta que tenga.     Document Released: 01/06/2009Document Revised: 04/16/2020Document Reviewed: 08/19/2019     [employer] Patient Education ? 2021 [employer]         Apendicectom?a abierta en adultos, cuidados posteriores     Open Appendectomy, Adult, Care After   Esta hoja le tung informaci?n sobre c?mo cuidarse despu?s del procedimiento. El m?dico tambi?n podrá darle instrucciones m?s espec?ficas. Comun?quese con el m?dico si tiene problemas o preguntas.   ?Qué puedo esperar despu?s del procedimiento?    Despu?s del procedimiento, es com?n tener los siguientes s?ntomas:       [place] energ?a para realizar las actividades normales.       Dolor en la michoacano donde se realizó la incisi?n.      Dificultad para defecar (estre?imiento). [place] puede ser consecuencia de:       Analg?sicos.       Cosme disminuci?n en el nivel de actividad.     Siga estas indicaciones en ramirez casa:   Medicamentos         [place] los medicamentos de venta bashir y los recetados solamente glenda se lo haya indicado el m?dico.       Si le recetaron un antibi?amirah, t?franz glenda se lo haya indicado el m?dico. No  deje de jeffrey los antibi?ticos aunque comience a sentirse mejor.      No  conduzca ni use maquinaria pesada mientras agueda analg?sicos recetados.      Preg?ntele al m?dico si el medicamento  recetado puede causarle estre?imiento. Es posible que tenga que jeffrey medidas para prevenir o tratar el estre?imiento, por ejemplo:       Beber suficiente l?quido glenda para mantener la orina de color amarillo p?lido.       Usar medicamentos recetados o de venta bashir.       Consumir alimentos ricos en fibra, glenda frijoles, cereales integrales, y frutas y verduras frescas.       Limitar el consumo de alimentos ricos en grasa y az?cares procesados, glenda los alimentos fritos o dulces.     Cuidado de la incisi?n           Siga las indicaciones del m?dico acerca del cuidado de la incisi?n. Aseg?rese de hacer lo siguiente:       L?vese las arpan con agua y jab?n antes y despu?s de cambiar la venda (vendaje). Use desinfectante para arpan si no dispone de agua y jab?n.       Cambie el vendaje glenda se lo haya indicado el m?dico.       No retire los puntos (suturas), la goma para cerrar la piel o las tiras adhesivas. Es posible que estos cierres cut?neos deban quedar puestos en la piel ean 2?semanas o m?s tiempo. Si los bordes de las tiras adhesivas empiezan a despegarse y enroscarse, puede recortar los que est?n sueltos. No  retire las tiras adhesivas por completo a menos que el m?dico se lo indique.      Controle la michoacano de la incisi?n todos los d?as para detectar signos de infecci?n. Preste atenci?n a los siguientes signos:       Enrojecimiento, hinchaz?n o dolor.       L?quido o kelly.       Calor.       Pus o mal olor.     Ba?arse         Mantenga la incisi?n limpia y seca. L?mpiela con la frecuencia que le haya indicado el m?dico. Para hacer esto:     Lave la incisi?n suavemente con agua y jab?n.       Enju?guela con agua para quitar todo el jab?n.       S?quela leonardo con cosme toalla limpia dando golpecitos. No  frote sobre la incisi?n.        No  tome ba?os de inmersi?n, no nade ni use un jacuzzi ean 2?semanas o hasta que el m?dico lo autorice. Puede jeffrey cosme ducha despu?s de 48?horas.     Actividad            No  conduzca ean 24?horas si le administraron un sedante ean el procedimiento.       Retome alina actividades normales seg?n lo indicado por el m?dico. Preg?ntele al m?dico qué actividades son seguras para usted.       Evite estar sentado ean largos per?odos sin moverse. Lev?ntese y camine un poco cada 1 a 2 horas. [place] es importante para mejorar el flujo sangu?poli y la respiraci?n. [place] ayuda si se siente d?alicia o inestable.      Ean 3?semanas o el tiempo que le haya indicado el m?dico:      No  levante objetos que pesen m?s de 10?libras (4,5?kg) o que linus m?s pesados de lo que le indicaron.      No  practique deportes de contacto.       Indicaciones generales         Si lo enviaron de regreso a ramirez casa con un drenaje, siga las indicaciones del m?dico sobre c?mo cuidarlo.       [place]bi respiraciones profundas. [place] ayuda a prevenir cosme infecci?n en los pulmones (neumon?a).       Concurra a todas las visitas de seguimiento glenda se lo haya indicado el m?dico. [place] es importante.       Comun?quese con un m?dico si:         Tiene enrojecimiento, hinchaz?n o dolor en el lugar de la incisi?n.       Presenta l?quido o kelly provenientes de la incisi?n.       Ramirez incisi?n se siente caliente al tacto.       Tiene secreci?n de pus o percibe un olor f?tido que emanan de la incisi?n o del vendaje.       Se le abren los bordes de la incisi?n despu?s de que le hayan sacado las suturas.       Siente m?s dolor en los hombros.       Se siente mareado o se desmaya.       Le falta el aire.       Sigue teniendo n?useas o vomitando.       Tiene diarrea o no pierde el control de las funciones intestinales.       Pierde el apetito.       Presenta dolor o hinchaz?n en las piernas.       Presenta cosme erupci?n cut?ravinder.     Solicite ayuda inmediatamente si tiene:         Fiebre.       Dificultad para respirar.       Staci bernardo en el pecho.     Resumen         Despu?s de cosme apendicectom?a abierta, es com?n tener poca energ?a  para realizar las actividades normales, sentir dolor en la michoacano de la incisi?n y tener estre?imiento.       La infecci?n es la complicaci?n m?s frecuente despu?s de marah procedimiento. Siga las indicaciones del m?dico acerca de c?mo cuidarse despu?s del procedimiento.       Retome alina actividades normales seg?n lo indicado por el m?dico. Preg?ntele al m?dico qué actividades son seguras para usted.       Comun?quese con ramirez m?dico si nota signos de infecci?n alrededor de la incisi?n o tiene dificultad para respirar. Solicite ayuda de inmediato si tiene fiebre, dolor en el pecho o dificultad para respirar.     Esta informaci?n no tiene glenda fin reemplazar el consejo del m?dico. Aseg?rese de hacerle al m?dico cualquier pregunta que tenga.     Document Released: 08/15/2012Document Revised: 04/16/2020Document Reviewed: 08/19/2019     Elsevier Patient Education ? 2021 [employer]

## 2022-03-05 NOTE — DISCHARGE SUMMARY
Discharge Summary    Patient name: Juan Michaels    Medical record number: [id number]    Admission date: 3/1/2022  Discharge date:  3/5/2022    Attending physician: Dr. Franz    Primary care physician: Provider, No Known    Referring physician: Sherif Franz MD  [address] [address]    Consulting physician(s):    Condition on discharge: stable    Primary Diagnoses: Acute appendicitis    Secondary Diagnoses:     Operative Procedure: Laparoscopic appendectomy converted to an open appendectomy    Hospital Course: The patient is a very pleasant 21 y.o. male that was admitted to the hospital with acute appendicitis.  He was taken to surgery and had a laparoscopic appendectomy that was converted to an open appendectomy because of a gangrenous perforated appendix.  By the day of discharge he was tolerating a regular diet without nausea or vomiting and moving his bowels well.  His drain was putting out serous drainage so the drain was removed without difficulty.  He had been moving his bowels.  His pain was well controlled with p.o. pain medications.  His incisions without signs of infection.  His prognosis is good.  He and his girlfriend were given complete discharge instructions which they appeared to understand.    Discharge medications:      Discharge Medications      New Medications      Instructions Start Date   amoxicillin-clavulanate 875-125 MG per tablet  Commonly known as: Augmentin   1 tablet, Oral, 2 Times Daily      oxyCODONE-acetaminophen 5-325 MG per tablet  Commonly known as: PERCOCET   1 tablet, Oral, Every 4 Hours PRN         Stop These Medications    acetaminophen 500 MG tablet  Commonly known as: TYLENOL            Discharge instructions:      · No driving for 1 week and when no longer taking narcotics.  · You may ride in a car  · Diet as tolerated  · You may walk as tolerated  · You may walk up and down stairs as tolerated  · Take a shower daily and wash your incisions with  antibacterial soap and water  · No lifting greater than 10 pounds for 6 weeks  · If you develop constipation, take a tablespoon of Milk of Magnesia once a day as needed  · You may place ice packs on your incisions for 20 minutes at a time as needed for pain  · You may take ibuprofen as directed for additional pain control      Follow-up appointment: Follow up with Dr. Franz in 1 to 2 weeks.  Call his office on Monday to schedule an appointment.

## 2022-03-06 ENCOUNTER — READMISSION MANAGEMENT (OUTPATIENT)
Dept: CALL CENTER | Facility: HOSPITAL | Age: 22
End: 2022-03-06

## 2022-03-06 LAB — BACTERIA SPEC ANAEROBE CULT: ABNORMAL

## 2022-03-06 NOTE — OUTREACH NOTE
Prep Survey    Flowsheet Row Responses   [facility] facility patient discharged from? [facility]   Is LACE score < 7 ? No   Emergency Room discharge w/ pulse ox? No   Eligibility Readm Mgmt   Discharge diagnosis acute appendicitis s/p Laparoscopic appendectomy converted to an open appendectomy   Does the patient have one of the following disease processes/diagnoses(primary or secondary)? General Surgery   Does the patient have Home health ordered? No   Is there a DME ordered? No   Prep survey completed? Yes          JESSICA DOWNING - Registered Nurse

## 2022-03-06 NOTE — CASE MANAGEMENT/SOCIAL WORK
Case Management Discharge Note      Final Note: dc home    Provided Post Acute Provider List?: N/A  Provided Post Acute Provider Quality & Resource List?: N/A    Selected Continued Care - Discharged on 3/5/2022 Admission date: 3/1/2022 - Discharge disposition: Home or Self Care    Destination    No services have been selected for the patient.              Durable Medical Equipment    No services have been selected for the patient.              Dialysis/Infusion    No services have been selected for the patient.              Home Medical Care    No services have been selected for the patient.              Therapy    No services have been selected for the patient.              Community Resources    No services have been selected for the patient.              Community & DME    No services have been selected for the patient.                       Final Discharge Disposition Code: 01 - home or self-care

## 2022-03-10 ENCOUNTER — READMISSION MANAGEMENT (OUTPATIENT)
Dept: CALL CENTER | Facility: HOSPITAL | Age: 22
End: 2022-03-10

## 2022-03-10 NOTE — OUTREACH NOTE
General Surgery Week 1 Survey    Flowsheet Row Responses   [facility] patient discharged from? [facility]   Does the patient have one of the following disease processes/diagnoses(primary or secondary)? General Surgery   Week 1 attempt successful? Yes   Call start time 1101   Call end time 1104   Discharge diagnosis acute appendicitis s/p Laparoscopic appendectomy converted to an open appendectomy   Is patient permission given to speak with other caregiver? Yes   List who call center can speak with Chepe--SO--he handed phone to her and she understands English   Person spoke with today (if not patient) and relationship Chepe   Meds reviewed with patient/caregiver? Yes   Is the patient having any side effects they believe may be caused by any medication additions or changes? No   Does the patient have all medications related to this admission filled (includes all antibiotics, pain medications, etc.) Yes   Is the patient taking all medications as directed (includes completed medication regime)? Yes   Does the patient have a follow up appointment scheduled with their surgeon? Yes   Has the patient kept scheduled appointments due by today? N/A   Comments has a f/u appt on Monday   Has home health visited the patient within 72 hours of discharge? N/A   Psychosocial issues? No   What is the patient's perception of their health status since discharge? Improving   Nursing interventions Nurse provided patient education   Is the patient /caregiver able to teach back basic post-op care? Drive as instructed by MD in discharge instructions, Take showers only when approved by MD-sponge bathe until then, No tub bath, swimming, or hot tub until instructed by MD, Keep incision areas clean,dry and protected, Lifting as instructed by MD in discharge instructions   Is the patient/caregiver able to teach back signs and symptoms of incisional infection? Increased redness, swelling or pain at the incisonal site, Increased drainage or  bleeding, Pus or odor from incision, Fever   Is the patient/caregiver able to teach back steps to recovery at home? Rest and rebuild strength, gradually increase activity, Eat a well-balance diet   Is the patient/caregiver able to teach back the hierarchy of who to call/visit for symptoms/problems? PCP, Specialist, Home health nurse, Urgent Care, ED, 911 Yes   Additional teach back comments SO states pt is doing betteer. cautioned against lifting heavy objects.    Week 1 call completed? Yes          DILLAN REYES - Registered Nurse

## 2022-03-14 ENCOUNTER — OFFICE VISIT (OUTPATIENT)
Dept: SURGERY | Facility: CLINIC | Age: 22
End: 2022-03-14

## 2022-03-14 VITALS — TEMPERATURE: 97.8 F

## 2022-03-14 DIAGNOSIS — Z09 SURGICAL FOLLOW-UP CARE: Primary | ICD-10-CM

## 2022-03-14 PROCEDURE — 99024 POSTOP FOLLOW-UP VISIT: CPT | Performed by: SURGERY

## 2022-03-14 NOTE — PROGRESS NOTES
Patient presents for 13 day po Laparoscopic appendectomy converted to an open appendectomy.hE STATES THAT HE HAS 6 DAYS LEFT OF THE ANTIBIOTIC. nO COMPLAINTS.  The  tablet was used.  He complains of incisional pain he is only taking Tylenol for the pain now.  He denies any fevers or chills he is eating well.  His incisions are healing well there is no impulse there is no infection.  His staples were removed.  He can stop his antibiotics at this point.  I would like to see him back in the office in 1 month

## 2022-03-18 ENCOUNTER — READMISSION MANAGEMENT (OUTPATIENT)
Dept: CALL CENTER | Facility: HOSPITAL | Age: 22
End: 2022-03-18

## 2022-03-18 NOTE — OUTREACH NOTE
General Surgery Week 2 Survey    Flowsheet Row Responses   [facility] facility patient discharged from? [facility]   Does the patient have one of the following disease processes/diagnoses(primary or secondary)? General Surgery   Week 2 attempt successful? No   Unsuccessful attempts Attempt 1          DEBRA GOMEZ - Registered Nurse

## 2022-03-23 ENCOUNTER — READMISSION MANAGEMENT (OUTPATIENT)
Dept: CALL CENTER | Facility: HOSPITAL | Age: 22
End: 2022-03-23

## 2022-03-23 NOTE — OUTREACH NOTE
General Surgery Week 2 Survey    Flowsheet Row Responses   [facility] facility patient discharged from? [facility]   Does the patient have one of the following disease processes/diagnoses(primary or secondary)? General Surgery   Week 2 attempt successful? No   Unsuccessful attempts Attempt 2          ODILON Santa Registered Nurse

## 2022-04-11 ENCOUNTER — OFFICE VISIT (OUTPATIENT)
Dept: SURGERY | Facility: CLINIC | Age: 22
End: 2022-04-11

## 2022-04-11 DIAGNOSIS — Z09 SURGICAL FOLLOW-UP CARE: Primary | ICD-10-CM

## 2022-04-11 PROCEDURE — 99024 POSTOP FOLLOW-UP VISIT: CPT | Performed by: SURGERY

## 2022-04-11 NOTE — PROGRESS NOTES
Patient presents for 41 day po  APPENDECTOMY LAPAROSCOPIC. He states that 4 days ago after work that both his legs were swollen.    The swelling of his legs was transient and has not recurred.   phone was used.  He is without complaints.  He is eating well his incision is well-healed there is no impulse.  His abdomen is benign.  He can resume normal activities and I will see him back as needed

## 2023-03-30 NOTE — PROGRESS NOTES
Patient: Juan Michaels  Procedure(s):  APPENDECTOMY LAPAROSCOPIC, attempted  APPENDECTOMY  Anesthesia type: general    Patient location: [facility] Surgical Floor  Last vitals:   Vitals:    03/02/22 0615   BP: 100/62   Pulse: 86   Resp: 17   Temp: 98.4 °F (36.9 °C)   SpO2: 98%     Level of consciousness: awake, alert and oriented    Post-anesthesia pain: adequate analgesia  Airway patency: patent  Respiratory: unassisted  Cardiovascular: stable and blood pressure at baseline  Hydration: euvolemic    Anesthetic complications: no   [facility] EMERGENCY DEPARTMENT  [address] [place] [address]  Phone: [phone]    ___ accompanied John Grant to the emergency department on 3/30/2023. They may return to work on 03/31/2023.        Thank you for choosing [facility].    Sravan Sanchez MD

## (undated) DEVICE — ELECTRD BLD MEGADYNE 2.75IN SS

## (undated) DEVICE — STRIP PACKING W IODOFORM 1/4

## (undated) DEVICE — BNDG ADHS CURAD FLX/FABRC 2X4IN STRL LF

## (undated) DEVICE — DECANTER: Brand: UNBRANDED

## (undated) DEVICE — Device

## (undated) DEVICE — TRAP FLD MINIVAC MEGADYNE 100ML

## (undated) DEVICE — TRAP SPECI MUCUS 80CC

## (undated) DEVICE — ENDOPATH XCEL BLUNT TIP TROCARS WITH SMOOTH SLEEVES: Brand: ENDOPATH XCEL

## (undated) DEVICE — TBG INSUFL W FLTR STRL

## (undated) DEVICE — TOTAL TRAY, 16FR 10ML SIL FOLEY, URN: Brand: MEDLINE

## (undated) DEVICE — ENDOPOUCH RETRIEVER SPECIMEN RETRIEVAL BAGS: Brand: ENDOPOUCH RETRIEVER

## (undated) DEVICE — SPNG LAP 18X18IN LF STRL PK/5

## (undated) DEVICE — RESERVOIR,SUCTION,100CC,SILICONE: Brand: MEDLINE

## (undated) DEVICE — SUT MNCRYL 4/0 PS2 18 IN

## (undated) DEVICE — SUT SILK 2/0 FS BLK 18IN 685G

## (undated) DEVICE — SUT SILK 2/0 LIGAPAK LA55G

## (undated) DEVICE — LAPAROSCOPIC TROCAR SLEEVE/SINGLE USE: Brand: KII® OPTICAL ACCESS SYSTEM

## (undated) DEVICE — ECHELON FLEX  POWERED VASCULAR STAPLER WITH ADVANCED PLACEMENT TIP, 35MM: Brand: ECHELON FLEX

## (undated) DEVICE — DRSNG PAD ABD 8X10IN STRL

## (undated) DEVICE — 5 MM BABCOCKS WITH RATCHET HANDLES: Brand: ENDOPATH

## (undated) DEVICE — SPNG GZ WOVN 4X4IN 12PLY 10/BX STRL

## (undated) DEVICE — MEDI-VAC YANK SUCT HNDL W/TPRD BULBOUS TIP: Brand: CARDINAL HEALTH

## (undated) DEVICE — BLD CLIP UNIV SURG GRY

## (undated) DEVICE — LAPAROSCOPIC SMOKE FILTRATION SYSTEM: Brand: PALL LAPAROSHIELD® PLUS LAPAROSCOPIC SMOKE FILTRATION SYSTEM

## (undated) DEVICE — SUT PROLN 1 CTX 30IN 8455H

## (undated) DEVICE — PK LAP GEN 90

## (undated) DEVICE — ENDOPATH 5MM ENDOSCOPIC BLUNT TIP DISSECTORS (12 POUCHES CONTAINING 3 DISSECTORS EACH): Brand: ENDOPATH

## (undated) DEVICE — PROXIMATE RH ROTATING HEAD SKIN STAPLERS (35 WIDE) CONTAINS 35 STAINLESS STEEL STAPLES: Brand: PROXIMATE

## (undated) DEVICE — PATIENT RETURN ELECTRODE, SINGLE-USE, CONTACT QUALITY MONITORING, ADULT, WITH 9FT CORD, FOR PATIENTS WEIGING OVER 33LBS. (15KG): Brand: MEGADYNE

## (undated) DEVICE — SPONGE,DRAIN,NONWVN,4"X4",6PLY,STRL,LF: Brand: MEDLINE

## (undated) DEVICE — LAPAROSCOPIC SCOPE WARMER: Brand: DEROYAL

## (undated) DEVICE — Device: Brand: CAUTERY TIP CLEANER

## (undated) DEVICE — SUT ETHIB 0/0 MO6 I8IN CX45D

## (undated) DEVICE — BNDG ADHS PLSTC 1X3IN LF

## (undated) DEVICE — METZENBAUM SCISSOR TIP, DISPOSABLE: Brand: RENEW

## (undated) DEVICE — TUBING, SUCTION, 1/4" X 12', STRAIGHT: Brand: MEDLINE

## (undated) DEVICE — TROCAR: Brand: KII® SLEEVE

## (undated) DEVICE — SAFELINER SUCTION CANISTER 1000CC: Brand: DEROYAL

## (undated) DEVICE — GLV SURG SIGNATURE ESSENTIAL PF LTX SZ8

## (undated) DEVICE — APPL CHLORAPREP HI/LITE 26ML ORNG

## (undated) DEVICE — PENCL ES MEGADINE EZ/CLEAN BUTN W/HOLSTR 10FT